# Patient Record
Sex: FEMALE | Race: WHITE | NOT HISPANIC OR LATINO | URBAN - METROPOLITAN AREA
[De-identification: names, ages, dates, MRNs, and addresses within clinical notes are randomized per-mention and may not be internally consistent; named-entity substitution may affect disease eponyms.]

---

## 2017-01-25 ENCOUNTER — COMMUNICATION - HEALTHEAST (OUTPATIENT)
Dept: FAMILY MEDICINE | Facility: CLINIC | Age: 67
End: 2017-01-25

## 2017-02-03 ENCOUNTER — OFFICE VISIT - HEALTHEAST (OUTPATIENT)
Dept: FAMILY MEDICINE | Facility: CLINIC | Age: 67
End: 2017-02-03

## 2017-02-03 DIAGNOSIS — Z01.818 PRE-OP EXAMINATION: ICD-10-CM

## 2017-02-03 LAB
ATRIAL RATE - MUSE: 70 BPM
DIASTOLIC BLOOD PRESSURE - MUSE: NORMAL MMHG
INTERPRETATION ECG - MUSE: NORMAL
P AXIS - MUSE: 31 DEGREES
PR INTERVAL - MUSE: 152 MS
QRS DURATION - MUSE: 78 MS
QT - MUSE: 372 MS
QTC - MUSE: 401 MS
R AXIS - MUSE: -37 DEGREES
SYSTOLIC BLOOD PRESSURE - MUSE: NORMAL MMHG
T AXIS - MUSE: 29 DEGREES
VENTRICULAR RATE- MUSE: 70 BPM

## 2017-02-03 RX ORDER — BIOTIN 1 MG
1000 TABLET ORAL 3 TIMES DAILY
Status: SHIPPED | COMMUNITY
Start: 2017-02-03

## 2017-02-03 ASSESSMENT — MIFFLIN-ST. JEOR: SCORE: 1132.85

## 2017-02-07 ENCOUNTER — SURGERY - HEALTHEAST (OUTPATIENT)
Dept: SURGERY | Facility: CLINIC | Age: 67
End: 2017-02-07

## 2017-02-07 ENCOUNTER — ANESTHESIA - HEALTHEAST (OUTPATIENT)
Dept: SURGERY | Facility: CLINIC | Age: 67
End: 2017-02-07

## 2017-02-07 ASSESSMENT — MIFFLIN-ST. JEOR: SCORE: 1107

## 2017-02-24 ENCOUNTER — RECORDS - HEALTHEAST (OUTPATIENT)
Dept: ADMINISTRATIVE | Facility: OTHER | Age: 67
End: 2017-02-24

## 2017-03-06 ENCOUNTER — ANESTHESIA - HEALTHEAST (OUTPATIENT)
Dept: SURGERY | Facility: CLINIC | Age: 67
End: 2017-03-06

## 2017-03-06 ASSESSMENT — MIFFLIN-ST. JEOR: SCORE: 1133

## 2017-03-07 ENCOUNTER — SURGERY - HEALTHEAST (OUTPATIENT)
Dept: SURGERY | Facility: CLINIC | Age: 67
End: 2017-03-07

## 2017-03-20 ENCOUNTER — COMMUNICATION - HEALTHEAST (OUTPATIENT)
Dept: FAMILY MEDICINE | Facility: CLINIC | Age: 67
End: 2017-03-20

## 2017-03-20 DIAGNOSIS — E78.5 HYPERLIPIDEMIA: ICD-10-CM

## 2017-03-28 ENCOUNTER — RECORDS - HEALTHEAST (OUTPATIENT)
Dept: ADMINISTRATIVE | Facility: OTHER | Age: 67
End: 2017-03-28

## 2017-04-12 ENCOUNTER — RECORDS - HEALTHEAST (OUTPATIENT)
Dept: ADMINISTRATIVE | Facility: OTHER | Age: 67
End: 2017-04-12

## 2017-06-15 ENCOUNTER — COMMUNICATION - HEALTHEAST (OUTPATIENT)
Dept: FAMILY MEDICINE | Facility: CLINIC | Age: 67
End: 2017-06-15

## 2017-09-20 ENCOUNTER — COMMUNICATION - HEALTHEAST (OUTPATIENT)
Dept: FAMILY MEDICINE | Facility: CLINIC | Age: 67
End: 2017-09-20

## 2017-09-20 DIAGNOSIS — K22.719 BARRETT'S ESOPHAGUS WITH DYSPLASIA: ICD-10-CM

## 2017-09-20 DIAGNOSIS — R41.840 ATTENTION OR CONCENTRATION DEFICIT: ICD-10-CM

## 2017-09-21 ENCOUNTER — COMMUNICATION - HEALTHEAST (OUTPATIENT)
Dept: FAMILY MEDICINE | Facility: CLINIC | Age: 67
End: 2017-09-21

## 2017-09-21 DIAGNOSIS — J45.909 ASTHMA: ICD-10-CM

## 2017-10-31 ENCOUNTER — OFFICE VISIT - HEALTHEAST (OUTPATIENT)
Dept: FAMILY MEDICINE | Facility: CLINIC | Age: 67
End: 2017-10-31

## 2017-10-31 DIAGNOSIS — Z82.62 FAMILY HISTORY OF OSTEOPOROSIS: ICD-10-CM

## 2017-10-31 DIAGNOSIS — Z00.00 HEALTH CARE MAINTENANCE: ICD-10-CM

## 2017-10-31 DIAGNOSIS — E78.5 HYPERLIPIDEMIA: ICD-10-CM

## 2017-10-31 DIAGNOSIS — K22.719 BARRETT'S ESOPHAGUS WITH DYSPLASIA: ICD-10-CM

## 2017-10-31 DIAGNOSIS — J45.909 ASTHMA: ICD-10-CM

## 2017-10-31 DIAGNOSIS — R41.840 ATTENTION OR CONCENTRATION DEFICIT: ICD-10-CM

## 2017-10-31 LAB
CHOLEST SERPL-MCNC: 234 MG/DL
FASTING STATUS PATIENT QL REPORTED: YES
HDLC SERPL-MCNC: 66 MG/DL
LDLC SERPL CALC-MCNC: 147 MG/DL
TRIGL SERPL-MCNC: 105 MG/DL

## 2017-10-31 ASSESSMENT — MIFFLIN-ST. JEOR: SCORE: 1131.43

## 2017-11-01 ENCOUNTER — COMMUNICATION - HEALTHEAST (OUTPATIENT)
Dept: FAMILY MEDICINE | Facility: CLINIC | Age: 67
End: 2017-11-01

## 2017-11-03 ENCOUNTER — RECORDS - HEALTHEAST (OUTPATIENT)
Dept: ADMINISTRATIVE | Facility: OTHER | Age: 67
End: 2017-11-03

## 2017-12-19 ENCOUNTER — COMMUNICATION - HEALTHEAST (OUTPATIENT)
Dept: FAMILY MEDICINE | Facility: CLINIC | Age: 67
End: 2017-12-19

## 2017-12-19 DIAGNOSIS — K22.719 BARRETT'S ESOPHAGUS WITH DYSPLASIA: ICD-10-CM

## 2017-12-20 ENCOUNTER — COMMUNICATION - HEALTHEAST (OUTPATIENT)
Dept: FAMILY MEDICINE | Facility: CLINIC | Age: 67
End: 2017-12-20

## 2017-12-20 DIAGNOSIS — R41.840 ATTENTION OR CONCENTRATION DEFICIT: ICD-10-CM

## 2018-01-29 ENCOUNTER — COMMUNICATION - HEALTHEAST (OUTPATIENT)
Dept: FAMILY MEDICINE | Facility: CLINIC | Age: 68
End: 2018-01-29

## 2018-01-29 DIAGNOSIS — K22.719 BARRETT'S ESOPHAGUS WITH DYSPLASIA: ICD-10-CM

## 2018-05-01 ENCOUNTER — COMMUNICATION - HEALTHEAST (OUTPATIENT)
Dept: FAMILY MEDICINE | Facility: CLINIC | Age: 68
End: 2018-05-01

## 2018-05-01 DIAGNOSIS — J45.909 ASTHMA: ICD-10-CM

## 2018-11-20 ENCOUNTER — COMMUNICATION - HEALTHEAST (OUTPATIENT)
Dept: FAMILY MEDICINE | Facility: CLINIC | Age: 68
End: 2018-11-20

## 2018-11-20 DIAGNOSIS — J45.909 ASTHMA: ICD-10-CM

## 2019-02-03 ENCOUNTER — COMMUNICATION - HEALTHEAST (OUTPATIENT)
Dept: FAMILY MEDICINE | Facility: CLINIC | Age: 69
End: 2019-02-03

## 2019-02-03 DIAGNOSIS — K22.719 BARRETT'S ESOPHAGUS WITH DYSPLASIA: ICD-10-CM

## 2019-02-08 ENCOUNTER — OFFICE VISIT - HEALTHEAST (OUTPATIENT)
Dept: FAMILY MEDICINE | Facility: CLINIC | Age: 69
End: 2019-02-08

## 2019-02-08 DIAGNOSIS — J45.20 MILD INTERMITTENT ASTHMA WITHOUT COMPLICATION: ICD-10-CM

## 2019-02-08 DIAGNOSIS — R41.840 ATTENTION OR CONCENTRATION DEFICIT: ICD-10-CM

## 2019-02-08 DIAGNOSIS — K22.719 BARRETT'S ESOPHAGUS WITH DYSPLASIA: ICD-10-CM

## 2019-02-08 DIAGNOSIS — J30.2 SEASONAL ALLERGIC RHINITIS, UNSPECIFIED TRIGGER: ICD-10-CM

## 2019-02-08 DIAGNOSIS — E78.5 HYPERLIPIDEMIA, UNSPECIFIED HYPERLIPIDEMIA TYPE: ICD-10-CM

## 2019-03-26 ENCOUNTER — OFFICE VISIT - HEALTHEAST (OUTPATIENT)
Dept: FAMILY MEDICINE | Facility: CLINIC | Age: 69
End: 2019-03-26

## 2019-03-26 DIAGNOSIS — Z00.01 ENCOUNTER FOR GENERAL ADULT MEDICAL EXAMINATION WITH ABNORMAL FINDINGS: ICD-10-CM

## 2019-03-26 DIAGNOSIS — J45.20 INTERMITTENT ASTHMA, UNSPECIFIED ASTHMA SEVERITY, UNSPECIFIED WHETHER COMPLICATED: ICD-10-CM

## 2019-03-26 DIAGNOSIS — Z12.31 VISIT FOR SCREENING MAMMOGRAM: ICD-10-CM

## 2019-03-26 DIAGNOSIS — Z78.0 POST-MENOPAUSAL: ICD-10-CM

## 2019-03-26 DIAGNOSIS — J30.9 ALLERGIC RHINITIS, UNSPECIFIED SEASONALITY, UNSPECIFIED TRIGGER: ICD-10-CM

## 2019-03-26 LAB
ALBUMIN SERPL-MCNC: 4.1 G/DL (ref 3.5–5)
ALP SERPL-CCNC: 62 U/L (ref 45–120)
ALT SERPL W P-5'-P-CCNC: 20 U/L (ref 0–45)
ANION GAP SERPL CALCULATED.3IONS-SCNC: 11 MMOL/L (ref 5–18)
AST SERPL W P-5'-P-CCNC: 21 U/L (ref 0–40)
BILIRUB SERPL-MCNC: 0.5 MG/DL (ref 0–1)
BUN SERPL-MCNC: 15 MG/DL (ref 8–22)
CALCIUM SERPL-MCNC: 10.1 MG/DL (ref 8.5–10.5)
CHLORIDE BLD-SCNC: 105 MMOL/L (ref 98–107)
CHOLEST SERPL-MCNC: 294 MG/DL
CO2 SERPL-SCNC: 26 MMOL/L (ref 22–31)
CREAT SERPL-MCNC: 0.79 MG/DL (ref 0.6–1.1)
FASTING STATUS PATIENT QL REPORTED: YES
GFR SERPL CREATININE-BSD FRML MDRD: >60 ML/MIN/1.73M2
GLUCOSE BLD-MCNC: 90 MG/DL (ref 70–125)
HDLC SERPL-MCNC: 72 MG/DL
LDLC SERPL CALC-MCNC: 206 MG/DL
POTASSIUM BLD-SCNC: 4.3 MMOL/L (ref 3.5–5)
PROT SERPL-MCNC: 7.2 G/DL (ref 6–8)
SODIUM SERPL-SCNC: 142 MMOL/L (ref 136–145)
TRIGL SERPL-MCNC: 81 MG/DL

## 2019-03-26 ASSESSMENT — MIFFLIN-ST. JEOR: SCORE: 1087.88

## 2019-03-27 ENCOUNTER — COMMUNICATION - HEALTHEAST (OUTPATIENT)
Dept: FAMILY MEDICINE | Facility: CLINIC | Age: 69
End: 2019-03-27

## 2019-03-27 ENCOUNTER — AMBULATORY - HEALTHEAST (OUTPATIENT)
Dept: FAMILY MEDICINE | Facility: CLINIC | Age: 69
End: 2019-03-27

## 2019-03-27 DIAGNOSIS — E78.5 HYPERLIPIDEMIA, UNSPECIFIED HYPERLIPIDEMIA TYPE: ICD-10-CM

## 2019-03-27 RX ORDER — ATORVASTATIN CALCIUM 10 MG/1
10 TABLET, FILM COATED ORAL DAILY
Qty: 90 TABLET | Refills: 1 | Status: SHIPPED | OUTPATIENT
Start: 2019-03-27 | End: 2021-09-20

## 2019-04-25 ENCOUNTER — OFFICE VISIT - HEALTHEAST (OUTPATIENT)
Dept: FAMILY MEDICINE | Facility: CLINIC | Age: 69
End: 2019-04-25

## 2019-04-25 DIAGNOSIS — J30.9 ALLERGIC RHINITIS, UNSPECIFIED SEASONALITY, UNSPECIFIED TRIGGER: ICD-10-CM

## 2019-04-25 DIAGNOSIS — J45.20 MILD INTERMITTENT ASTHMA WITHOUT COMPLICATION: ICD-10-CM

## 2019-04-25 DIAGNOSIS — E78.2 MIXED HYPERLIPIDEMIA: ICD-10-CM

## 2019-04-25 RX ORDER — OLOPATADINE HYDROCHLORIDE 1 MG/ML
SOLUTION/ DROPS OPHTHALMIC
Qty: 5 ML | Refills: 12 | Status: SHIPPED | OUTPATIENT
Start: 2019-04-25

## 2019-05-03 ENCOUNTER — HOSPITAL ENCOUNTER (OUTPATIENT)
Dept: MAMMOGRAPHY | Facility: CLINIC | Age: 69
Discharge: HOME | End: 2019-05-03

## 2019-05-03 ENCOUNTER — RECORDS - HEALTHEAST (OUTPATIENT)
Dept: ADMINISTRATIVE | Facility: OTHER | Age: 69
End: 2019-05-03

## 2019-05-03 ENCOUNTER — RECORDS - HEALTHEAST (OUTPATIENT)
Dept: BONE DENSITY | Facility: CLINIC | Age: 69
End: 2019-05-03

## 2019-05-03 DIAGNOSIS — Z78.0 ASYMPTOMATIC MENOPAUSAL STATE: ICD-10-CM

## 2019-05-03 DIAGNOSIS — Z12.31 VISIT FOR SCREENING MAMMOGRAM: ICD-10-CM

## 2019-05-08 ENCOUNTER — COMMUNICATION - HEALTHEAST (OUTPATIENT)
Dept: FAMILY MEDICINE | Facility: CLINIC | Age: 69
End: 2019-05-08

## 2019-05-08 DIAGNOSIS — J30.2 SEASONAL ALLERGIC RHINITIS, UNSPECIFIED TRIGGER: ICD-10-CM

## 2019-05-08 RX ORDER — DESLORATADINE 5 MG/1
TABLET ORAL
Qty: 90 TABLET | Refills: 3 | Status: SHIPPED | OUTPATIENT
Start: 2019-05-08

## 2019-05-24 ENCOUNTER — COMMUNICATION - HEALTHEAST (OUTPATIENT)
Dept: FAMILY MEDICINE | Facility: CLINIC | Age: 69
End: 2019-05-24

## 2019-05-24 DIAGNOSIS — R41.840 ATTENTION OR CONCENTRATION DEFICIT: ICD-10-CM

## 2019-08-12 ENCOUNTER — COMMUNICATION - HEALTHEAST (OUTPATIENT)
Dept: FAMILY MEDICINE | Facility: CLINIC | Age: 69
End: 2019-08-12

## 2019-08-12 DIAGNOSIS — K22.719 BARRETT'S ESOPHAGUS WITH DYSPLASIA: ICD-10-CM

## 2019-08-16 ENCOUNTER — COMMUNICATION - HEALTHEAST (OUTPATIENT)
Dept: FAMILY MEDICINE | Facility: CLINIC | Age: 69
End: 2019-08-16

## 2019-08-16 DIAGNOSIS — E78.5 HYPERLIPIDEMIA, UNSPECIFIED HYPERLIPIDEMIA TYPE: ICD-10-CM

## 2019-11-14 ENCOUNTER — RECORDS - HEALTHEAST (OUTPATIENT)
Dept: ADMINISTRATIVE | Facility: OTHER | Age: 69
End: 2019-11-14

## 2019-11-18 ENCOUNTER — COMMUNICATION - HEALTHEAST (OUTPATIENT)
Dept: FAMILY MEDICINE | Facility: CLINIC | Age: 69
End: 2019-11-18

## 2019-11-18 DIAGNOSIS — J45.20 MILD INTERMITTENT ASTHMA WITHOUT COMPLICATION: ICD-10-CM

## 2019-12-02 ENCOUNTER — COMMUNICATION - HEALTHEAST (OUTPATIENT)
Dept: FAMILY MEDICINE | Facility: CLINIC | Age: 69
End: 2019-12-02

## 2020-05-12 ENCOUNTER — COMMUNICATION - HEALTHEAST (OUTPATIENT)
Dept: FAMILY MEDICINE | Facility: CLINIC | Age: 70
End: 2020-05-12

## 2020-05-12 DIAGNOSIS — K22.719 BARRETT'S ESOPHAGUS WITH DYSPLASIA: ICD-10-CM

## 2020-05-12 DIAGNOSIS — R41.840 ATTENTION OR CONCENTRATION DEFICIT: ICD-10-CM

## 2020-05-18 ENCOUNTER — COMMUNICATION - HEALTHEAST (OUTPATIENT)
Dept: FAMILY MEDICINE | Facility: CLINIC | Age: 70
End: 2020-05-18

## 2020-05-18 DIAGNOSIS — J45.20 MILD INTERMITTENT ASTHMA WITHOUT COMPLICATION: ICD-10-CM

## 2020-06-08 ENCOUNTER — COMMUNICATION - HEALTHEAST (OUTPATIENT)
Dept: FAMILY MEDICINE | Facility: CLINIC | Age: 70
End: 2020-06-08

## 2020-06-08 DIAGNOSIS — J45.20 MILD INTERMITTENT ASTHMA WITHOUT COMPLICATION: ICD-10-CM

## 2020-06-08 DIAGNOSIS — R41.840 ATTENTION OR CONCENTRATION DEFICIT: ICD-10-CM

## 2020-06-08 DIAGNOSIS — K22.719 BARRETT'S ESOPHAGUS WITH DYSPLASIA: ICD-10-CM

## 2020-06-08 RX ORDER — MONTELUKAST SODIUM 10 MG/1
10 TABLET ORAL DAILY
Qty: 90 TABLET | Refills: 3 | Status: SHIPPED | OUTPATIENT
Start: 2020-06-08

## 2020-06-08 RX ORDER — LANSOPRAZOLE 30 MG/1
30 CAPSULE, DELAYED RELEASE ORAL DAILY
Qty: 90 CAPSULE | Refills: 3 | Status: SHIPPED | OUTPATIENT
Start: 2020-06-08

## 2020-06-08 RX ORDER — BUPROPION HYDROCHLORIDE 300 MG/1
300 TABLET ORAL EVERY MORNING
Qty: 90 TABLET | Refills: 3 | Status: SHIPPED | OUTPATIENT
Start: 2020-06-08 | End: 2021-09-20

## 2020-06-12 ENCOUNTER — OFFICE VISIT - HEALTHEAST (OUTPATIENT)
Dept: FAMILY MEDICINE | Facility: CLINIC | Age: 70
End: 2020-06-12

## 2020-06-12 DIAGNOSIS — F32.89 OTHER DEPRESSION: ICD-10-CM

## 2020-06-12 DIAGNOSIS — H04.123 DRY EYES: ICD-10-CM

## 2020-06-12 DIAGNOSIS — E78.00 PURE HYPERCHOLESTEROLEMIA: ICD-10-CM

## 2020-06-12 DIAGNOSIS — J45.20 MILD INTERMITTENT ASTHMA WITHOUT COMPLICATION: ICD-10-CM

## 2020-06-12 DIAGNOSIS — D22.9 CHANGE IN SKIN MOLE: ICD-10-CM

## 2020-06-12 RX ORDER — ROSUVASTATIN CALCIUM 5 MG/1
5 TABLET, COATED ORAL AT BEDTIME
Qty: 30 TABLET | Refills: 11 | Status: SHIPPED | OUTPATIENT
Start: 2020-06-12

## 2020-06-12 RX ORDER — ALBUTEROL SULFATE 90 UG/1
2 AEROSOL, METERED RESPIRATORY (INHALATION) EVERY 6 HOURS PRN
Qty: 1 INHALER | Refills: 3 | Status: SHIPPED | OUTPATIENT
Start: 2020-06-12

## 2020-07-20 ENCOUNTER — RECORDS - HEALTHEAST (OUTPATIENT)
Dept: ADMINISTRATIVE | Facility: OTHER | Age: 70
End: 2020-07-20

## 2020-08-10 ENCOUNTER — RECORDS - HEALTHEAST (OUTPATIENT)
Dept: ADMINISTRATIVE | Facility: OTHER | Age: 70
End: 2020-08-10

## 2020-11-24 ENCOUNTER — RECORDS - HEALTHEAST (OUTPATIENT)
Dept: ADMINISTRATIVE | Facility: OTHER | Age: 70
End: 2020-11-24

## 2021-04-15 ENCOUNTER — COMMUNICATION - HEALTHEAST (OUTPATIENT)
Dept: FAMILY MEDICINE | Facility: CLINIC | Age: 71
End: 2021-04-15

## 2021-04-15 DIAGNOSIS — K22.719 BARRETT'S ESOPHAGUS WITH DYSPLASIA: ICD-10-CM

## 2021-04-19 ENCOUNTER — COMMUNICATION - HEALTHEAST (OUTPATIENT)
Dept: FAMILY MEDICINE | Facility: CLINIC | Age: 71
End: 2021-04-19

## 2021-04-19 DIAGNOSIS — R41.840 ATTENTION OR CONCENTRATION DEFICIT: ICD-10-CM

## 2021-04-19 DIAGNOSIS — J45.20 MILD INTERMITTENT ASTHMA WITHOUT COMPLICATION: ICD-10-CM

## 2021-04-19 DIAGNOSIS — K22.719 BARRETT'S ESOPHAGUS WITH DYSPLASIA: ICD-10-CM

## 2021-05-27 NOTE — TELEPHONE ENCOUNTER
----- Message from Nehemiah Akers MD sent at 3/27/2019  8:50 AM CDT -----  Please inform patient that her cholesterol profile shows extremely high cholesterol levels including extremely high LDL that is the bad cholesterol level.  She should be on a statin therapy.  Review of chart shows that she was taking Lipitor in the past.  Please ask patient if she has discontinued Lipitor?  Or if she ran out of the medication.  I am a sending a refill and she should resume the medication and we should recheck these numbers in about 3 months time.  Nehemiah Akers MD  3/27/2019

## 2021-05-27 NOTE — PROGRESS NOTES
Assessment and Plan:     1. Visit for screening mammogram  Mammo Screening Bilateral   2. Encounter for general adult medical examination with abnormal findings  Lipid Cameron FASTING    Comprehensive Metabolic Panel   3. Post-menopausal  DXA Bone Density Scan   4. Allergic rhinitis, unspecified seasonality, unspecified trigger  fluticasone (FLONASE) 50 mcg/actuation nasal spray    olopatadine (PATANOL) 0.1 % ophthalmic solution   5. Intermittent asthma, unspecified asthma severity, unspecified whether complicated         The patient's current medical problems were reviewed.    I have had an Advance Directives discussion with the patient.  The following health maintenance schedule was reviewed with the patient and provided in printed form in the after visit summary:   Health Maintenance   Topic Date Due     ASTHMA FOLLOW-UP  1950     ZOSTER VACCINES (2 of 3) 11/21/2016     DXA SCAN  04/25/2017     MAMMOGRAM  10/11/2018     COLONOSCOPY  04/22/2019 (Originally 4/21/2019)     ASTHMA CONTROL TEST  03/26/2020     FALL RISK ASSESSMENT  03/26/2020     ADVANCE DIRECTIVES DISCUSSED WITH PATIENT  09/26/2021     TD 18+ HE  09/26/2026     PNEUMOCOCCAL POLYSACCHARIDE VACCINE AGE 65 AND OVER  Completed     INFLUENZA VACCINE RULE BASED  Completed     PNEUMOCOCCAL CONJUGATE VACCINE FOR ADULTS (PCV13 OR PREVNAR)  Completed        Subjective:     Chief Complaint   Patient presents with     Annual Wellness Visit     fasting labs     Chief Complaint: Judith Moreno is an 68 y.o. female here for an Annual Wellness visit.     HPI: She has a lot of symptoms of allergies.  She also has intermittent asthma and is only on albuterol.  Although on discussing with her it appears most of her symptoms are related to nasal congestion and postnasal drip and she complains about watery eyes whenever she goes out.  She denies any fever.  She denies any productive cough.  She relates she is allergic to her cats.    She informs me that her  appetite is good.  No chest pain.  No nausea vomiting diarrhea.    Review of Systems: Please see above.  The rest of the review of systems are negative for all systems.    Patient Care Team:  Nehemiah Akers MD as PCP - General (Family Medicine)     Patient Active Problem List   Diagnosis     Asthma     Decreased Concentrating Ability     Short Segment Mosley's Esophagus     Hyperlipidemia     Past Medical History:   Diagnosis Date     ADD (attention deficit disorder)      Asthma      Mosley's esophagus      GERD (gastroesophageal reflux disease)      Hip pain     Right Side     Low back pain     Right Side     Right foot pain       Past Surgical History:   Procedure Laterality Date     ANKLE ARTHROSCOPY Right 3/7/2017    Procedure: STAGE II:  RIGHT ANKLE ARTHROSCOPY ;  Surgeon: Binh Arrington MD;  Location: [facility];  Service:      ANKLE LIGAMENT RECONSTRUCTION Right 3/7/2017    Procedure: LATERAL ANKLE LIGAMENT RECONSTRUCTION WITH INTERNAL BRACE;  Surgeon: Binh Arrington MD;  Location: [facility];  Service:      GANGLION CYST EXCISION Bilateral     right hand x1, left x2     OR APPENDECTOMY      Description: Appendectomy;  Recorded: 2014;  Comments: ? with      OR  DELIVERY ONLY      Description:  Section Classical;  Recorded: 2014;  Comments: x 2 and tubal ligation     OR FUSION FOOT BONES,TRIPLE Right 2017    Procedure: STAGE 1: RIGHT NAVICULOCUNEIFORM ARTHRODESIS WITH CALCANEAL BONE GRAFT;  Surgeon: Binh Arrington MD;  Location: [facility];  Service: Orthopedics     OR HALLUX RIGIDUS W/CHEILECTOMY 1ST MP JT W/O IMPLT Right 3/7/2017    Procedure: CHEILECTOMY WITH POSSIBLE SOFT TISSUE ARTHROPLASTY;  Surgeon: Binh Arrington MD;  Location: [facility];  Service: Orthopedics      Family History   Problem Relation Age of Onset     Breast cancer Mother 80         lived to 92     Cancer Father         unknown       Social History     Socioeconomic History     Marital status: Single     Spouse name: Not on file     Number of children: Not on file     Years of education: Not on file     Highest education level: Not on file   Occupational History     Not on file   Social Needs     Financial resource strain: Not on file     Food insecurity:     Worry: Not on file     Inability: Not on file     Transportation needs:     Medical: Not on file     Non-medical: Not on file   Tobacco Use     Smoking status: Former Smoker     Packs/day: 0.50     Years: 20.00     Pack years: 10.00     Last attempt to quit:      Years since quittin.2     Smokeless tobacco: Never Used   Substance and Sexual Activity     Alcohol use: Yes     Comment: rarely     Drug use: No     Sexual activity: Not on file   Lifestyle     Physical activity:     Days per week: Not on file     Minutes per session: Not on file     Stress: Not on file   Relationships     Social connections:     Talks on phone: Not on file     Gets together: Not on file     Attends [employer] service: Not on file     Active member of club or organization: Not on file     Attends meetings of clubs or organizations: Not on file     Relationship status: Not on file     Intimate partner violence:     Fear of current or ex partner: Not on file     Emotionally abused: Not on file     Physically abused: Not on file     Forced sexual activity: Not on file   Other Topics Concern     Not on file   Social History Narrative    Lives with Male partner. 2 cats and 2 dogs. Has 2 adult children and one adopted and  grand children.    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        Nehemiah Akers MD  3/26/2019            The 10-year ASCVD risk score (Renzo METZ Jr., et al., 2013) is: 6%      Values used to calculate the score:        Age: 68 years        Sex: Female        Is Non- : No        Diabetic: No        Tobacco smoker: No        Systolic Blood Pressure:  "120 mmHg        Is BP treated: No        HDL Cholesterol: 72 mg/dL        Total Cholesterol: 294 mg/dL      Current Outpatient Medications   Medication Sig Dispense Refill     albuterol (VENTOLIN HFA) 90 mcg/actuation inhaler Inhale 2 puffs every 6 (six) hours as needed. 1 Inhaler 3     biotin 1 mg tablet Take 1,000 mcg by mouth 3 (three) times a day.       CALCIUM CARBONATE (CALCIUM 500 ORAL) Take 500 mg by mouth daily.        CHOLECALCIFEROL, VITAMIN D3, (D3-2000 ORAL) Take 1 capsule by mouth daily.        desloratadine (CLARINEX) 5 mg tablet Take 1 tablet (5 mg total) by mouth daily. 30 tablet 2     lansoprazole (PREVACID) 30 MG capsule Take 1 capsule (30 mg total) by mouth daily. 90 capsule 1     LUTEIN ORAL Take 1 tablet by mouth daily.        montelukast (SINGULAIR) 10 mg tablet Take 1 tablet (10 mg total) by mouth daily. Needs office visit before more refills 90 tablet 1     atorvastatin (LIPITOR) 10 MG tablet Take 1 tablet (10 mg total) by mouth daily. 90 tablet 1     fluticasone (FLONASE) 50 mcg/actuation nasal spray 2 sprays into each nostril daily. 16 g 12     olopatadine (PATANOL) 0.1 % ophthalmic solution 1 drop two times a day 5 mL 12     No current facility-administered medications for this visit.       Objective:   Vital Signs:   Visit Vitals  /79 (Patient Site: Left Arm, Patient Position: Sitting, Cuff Size: Adult Regular)   Pulse 76   Temp 96.8  F (36  C) (Oral)   Resp 16   Ht 5' 4\" (1.626 m)   Wt 127 lb 6.4 oz (57.8 kg)   LMP 10/11/2006   BMI 21.87 kg/m         VisionScreening:  No exam data present     PHYSICAL EXAM    General:Healthy, alert and in no acute distress  Head:  NCAT w/o lesions or tenderness  Eyes: conjunctivae/corneas clear. PERRL, EOM's intact. Fundi benign  Ears: normal TM's and external ear canals bilateral  Sinus tender: negative  Nose: Enlarged and erythematous turbinates  Mouth: lips, mucosa, and tongue normal. Teeth and gums normal. No tonsillar endangerment, Mild " erythema of pharynx  Neck: supple, symmetrical, trachea midline.  Lungs: clear to auscultation bilaterally  Heart: RRR, No murmurs  Abdomen: Soft NTND, No HSM, No peritoneal signs, Rebound negative, Mc Milan's sign negative, No CVA tenderness.  Skin: No rashes        Assessment Results 3/26/2019   Activities of Daily Living No help needed   Instrumental Activities of Daily Living No help needed   Mini Cog Total Score 3   Some recent data might be hidden     A Mini-Cog score of 0-2 suggests the possibility of dementia, score of 3-5 suggests no dementia    Identified Health Risks:     The patient was provided with written information regarding signs of hearing loss.  Information regarding advance directives (living garcia), including where she can download the appropriate form, was provided to the patient via the AVS.

## 2021-05-28 ASSESSMENT — ASTHMA QUESTIONNAIRES: ACT_TOTALSCORE: 21

## 2021-05-28 NOTE — TELEPHONE ENCOUNTER
Refill Approved    Rx renewed per Medication Renewal Policy. Medication was last renewed on 2/8/19.    Roxann Hurley, [facility] Triage/Med Refill 5/8/2019     Requested Prescriptions   Pending Prescriptions Disp Refills     desloratadine (CLARINEX) 5 mg tablet [Pharmacy Med Name: Desloratadine Oral Tablet 5 MG] 30 tablet 1     Sig: TAKE ONE TABLET BY MOUTH ONE TIME DAILY       Antihistamine Refill Protocol Passed - 5/8/2019  7:44 AM        Passed - Patient has had office visit/physical in last year     Last office visit with prescriber/PCP: 2/8/2019 Fiordaliza Griffin FNP OR same dept: 4/25/2019 Nehemiah Akers MD OR same specialty: 4/25/2019 Nehemiah Akers MD  Last physical: 10/31/2017 Last MTM visit: Visit date not found   Next visit within 3 mo: Visit date not found  Next physical within 3 mo: Visit date not found  Prescriber OR PCP: MARY Daugherty  Last diagnosis associated with med order: 1. Seasonal allergic rhinitis, unspecified trigger  - desloratadine (CLARINEX) 5 mg tablet [Pharmacy Med Name: Desloratadine Oral Tablet 5 MG]; TAKE ONE TABLET BY MOUTH ONE TIME DAILY   Dispense: 30 tablet; Refill: 1    If protocol passes may refill for 12 months if within 3 months of last provider visit (or a total of 15 months).

## 2021-05-28 NOTE — PATIENT INSTRUCTIONS - HE
Follow up in the fall for allergies and possible asthma      Come back in 6- 8 weeeks for recheck Lipids ( lab only visit).    Goal for LDL is < 130- 160.    Nehemiah Akers MD  4/25/2019

## 2021-05-28 NOTE — PROGRESS NOTES
Assessment:     1. Mixed hyperlipidemia  Lipid [place]   2. Mild intermittent asthma without complication  montelukast (SINGULAIR) 10 mg tablet   3. Allergic rhinitis, unspecified seasonality, unspecified trigger  fluticasone propionate (FLONASE) 50 mcg/actuation nasal spray    olopatadine (PATANOL) 0.1 % ophthalmic solution      .      Plan:      The diagnosis was discussed with the patient and evaluation and treatment plans outlined.  See orders for lab and imaging studies.   Patient Instructions   Follow up in the fall for allergies and possible asthma      Come back in 6- 8 weeeks for recheck Lipids ( lab only visit).    Goal for LDL is < 130- 160.    Nehemiah Akers MD  4/25/2019             Points   Total Points: 16 = 4% risk of heart disease in 10 years.  Average 10-year risk: = 9% (for others in your age group).  LDL Cholesterol Goal: = <160 mg/dl    This calculation is recommended by the National Cholesterol Education Program Adult Treatment Panel III to help determine your goal LDL cholesterol level. Your LDL cholesterol goal is < 100 if you have established coronary heart disease, peripheral arterial disease, diabetes, or a calculated 10-year risk for CHD of > 20%.  The results you receive from this tool are for informational purposes only and should not be the basis of your medical decision making. Consult your physician to determine the medical implications of any tests you take.      Subjective:      Judith Moreno is a  female who presents for evaluation of   Chief Complaint   Patient presents with     Follow-up     Pt heretoday for follow up of medications that were added from las  on 3/26/19- Pt states Rx has helped the allergies      1- Seasonal Allergies: These are remarkably better after starting nasal steroid and allergy medication.  She informed me that she is actually able to sit out side without her eyes watering.  Her left knee and had been started at the last visit.  Please see my  last note for details.    2- Hyperlipidemia: Total cholesterol was greater than 300 with LDL greater than 2016.  Subsequently she was started on Lipitor.  At the last visit it was noted that her LDL is greater than 200 again.  Patient does admit that she had not been taking the Lipitor which was then reinitiated.  We discussed with her that we should check a lipid panel in a couple of months.  She can come for lab only visit.    3- Mild intermittent Asthma: Currently she has no wheezing.  She does have albuterol to use as needed.  As discussed with her at last visit, most of her symptoms appear to be seasonal allergies.  I did discuss that she should follow-up when her symptoms start getting up as she is currently not on any inhaled steroid to evaluate for the need.  We discussed that she does not need to be on steroid inhaler indefinitely.  We can do the stepup and stepdown therapy.  Patient is agreeable to admit her come back in follow-up in her asthma exacerbate.  Meanwhile she will use albuterol as needed.      The following portions of the patient's history were reviewed and updated as appropriate: allergies, current medications, past family history, past medical history, past social history, past surgical history and problem list.    Review of Systems  A 12 point comprehensive review of systems was negative except as noted.       Objective:   /72 (Patient Site: Left Arm, Patient Position: Sitting, Cuff Size: Adult Regular)   Pulse 72   Resp 16   Wt 124 lb 6.4 oz (56.4 kg)   LMP 10/11/2006   SpO2 98%   BMI 21.35 kg/m      General:Healthy, alert and in no acute distress  Head:  NCAT w/o lesions or tenderness  Eyes: conjunctivae/corneas clear. PERRL, EOM's intact. Fundi benign  Ears: normal TM's and external ear canals bilateral  Sinus tender: negative  Nose: Erythematous turbinates that are less swollen than before.  Mouth: lips, mucosa, and tongue normal. Teeth and gums normal. No tonsillar  endangerment or  erythema of pharynx  Neck: supple, symmetrical, trachea midline.  Lungs: clear to auscultation bilaterally  Heart: RRR, No murmurs  Skin: No rashes

## 2021-05-29 ENCOUNTER — HEALTH MAINTENANCE LETTER (OUTPATIENT)
Age: 71
End: 2021-05-29

## 2021-05-29 NOTE — TELEPHONE ENCOUNTER
Medication Request  Medication name: bupropion 300 mg - take 1 tab by mouth daily  Pharmacy Name and Location: [facility]  Reason for request: Patient stated she would like to restart this again.  When did you use medication last?:  Feb 2019  Patient offered appointment:  Patient stated she talked to Dr. Akers about restarting the bupropion Rx. Patient stated she was told by Dr. Akers that she would get an Rx for it.  Okay to leave a detailed message: yes  [phone]

## 2021-05-29 NOTE — TELEPHONE ENCOUNTER
Please inform patient that I am starting her on bupropion 150 mg daily  and in 1 week she can increase it to 300 mg daily     Nehemiah Akers MD  5/28/2019

## 2021-05-29 NOTE — TELEPHONE ENCOUNTER
Left message to call back for: Bupropion Refill  Information to relay to patient: Left detailed message for patient with Dr. Akers's instructions below.  Pt to call back with any further questions or concerns.

## 2021-05-30 VITALS — WEIGHT: 137.35 LBS | BODY MASS INDEX: 23.45 KG/M2 | HEIGHT: 64 IN

## 2021-05-30 VITALS — HEIGHT: 64 IN | WEIGHT: 137.31 LBS | BODY MASS INDEX: 23.44 KG/M2

## 2021-05-30 VITALS — HEIGHT: 64 IN | WEIGHT: 131.61 LBS | BODY MASS INDEX: 22.47 KG/M2

## 2021-05-31 VITALS — HEIGHT: 64 IN | BODY MASS INDEX: 23.39 KG/M2 | WEIGHT: 137 LBS

## 2021-05-31 NOTE — TELEPHONE ENCOUNTER
Patient Returning Call  Reason for call:  Patient returning call on refills she requested.  Information relayed to patient:  Patient was informed the Prevacid was sent to the pharmacy on 8/13/19, the other medications were to be requested by the pharmacy except for the Lipitor and that required labs prior to being filled. Patient was transferred to [facility] for a lab only appointment.    Patient has additional questions:  Yes  If YES, what are your questions/concerns:  Patient agreed to return call to clinic if there were any other concerns with the medication refills she needed.  Okay to leave a detailed message?: Yes

## 2021-05-31 NOTE — TELEPHONE ENCOUNTER
Refill Approved    Rx renewed per Medication Renewal Policy. Medication was last renewed on 2/8/19.    Roxann Hurley, [facility] Triage/Med Refill 8/13/2019     Requested Prescriptions   Pending Prescriptions Disp Refills     lansoprazole (PREVACID) 30 MG capsule [Pharmacy Med Name: Lansoprazole Oral Capsule Delayed Release 30 MG] 90 capsule 0     Sig: TAKE ONE CAPSULE BY MOUTH ONE TIME DAILY       GI Medications Refill Protocol Passed - 8/12/2019  6:56 PM        Passed - PCP or prescribing provider visit in last 12 or next 3 months.     Last office visit with prescriber/PCP: 2/8/2019 Fiordaliza Griffin FNP OR same dept: 4/25/2019 Nehemiah Akers MD OR same specialty: 4/25/2019 Nehemiah Akers MD  Last physical: 10/31/2017 Last MTM visit: Visit date not found   Next visit within 3 mo: Visit date not found  Next physical within 3 mo: Visit date not found  Prescriber OR PCP: MARY Daugherty  Last diagnosis associated with med order: 1. Mosley's esophagus with dysplasia  - lansoprazole (PREVACID) 30 MG capsule [Pharmacy Med Name: Lansoprazole Oral Capsule Delayed Release 30 MG]; TAKE ONE CAPSULE BY MOUTH ONE TIME DAILY   Dispense: 90 capsule; Refill: 0    If protocol passes may refill for 12 months if within 3 months of last provider visit (or a total of 15 months).

## 2021-06-02 ENCOUNTER — RECORDS - HEALTHEAST (OUTPATIENT)
Dept: ADMINISTRATIVE | Facility: CLINIC | Age: 71
End: 2021-06-02

## 2021-06-02 VITALS — BODY MASS INDEX: 21.75 KG/M2 | HEIGHT: 64 IN | WEIGHT: 127.4 LBS

## 2021-06-02 VITALS — WEIGHT: 127.8 LBS | BODY MASS INDEX: 21.94 KG/M2

## 2021-06-03 VITALS — WEIGHT: 124.4 LBS | BODY MASS INDEX: 21.35 KG/M2

## 2021-06-03 NOTE — TELEPHONE ENCOUNTER
Refill Approved    Rx renewed per Medication Renewal Policy. Medication was last renewed on 4/25/2019         Adam Garcia, [facility] Triage/Med Refill 11/19/2019     Requested Prescriptions   Pending Prescriptions Disp Refills     montelukast (SINGULAIR) 10 mg tablet 90 tablet 1     Sig: Take 1 tablet (10 mg total) by mouth daily. Needs office visit before more refills       Asthma Medications Refill Protocol Passed - 11/18/2019 12:11 PM        Passed - PCP or prescribing provider visit in last year     Last office visit with prescriber/PCP: 4/25/2019 Nehemiah Akers MD OR same dept: 4/25/2019 Nehemiah Akers MD OR same specialty: 4/25/2019 Nehemiah Akers MD  Last physical: 3/26/2019 Last MTM visit: Visit date not found    Next appt within 3 mo: Visit date not found Next physical within 3 mo: Visit date not found  Prescriber OR PCP: Nehemiah Akers MD  Last diagnosis associated with med order: 1. Mild intermittent asthma without complication  - montelukast (SINGULAIR) 10 mg tablet; Take 1 tablet (10 mg total) by mouth daily. Needs office visit before more refills  Dispense: 90 tablet; Refill: 1    If protocol passes may refill for 6 months if within 3 months of last provider visit (or a total of 9 months).

## 2021-06-03 NOTE — TELEPHONE ENCOUNTER
New Appointment Needed  What is the reason for the visit:  Same Day  Same Date/Next Day Appt Request  What is the reason for your visit?: Going on 3 weeks with bronchitis, had went to urgency room and was put on a Steroid to reduce inflammation, but patient just does not think she can shake this, wants to make sure there is not something secondary. Patient is constantly coughing and feels like she is suffocating, has gotten worse.  --Declined nurse triage at this time, patient is going to try to go to work.  Provider Preference: Dr. Toni Kemp at [facility].  Patient informed that she used to see him when he practiced at another clinic and would really like to see him for this.  How soon do you need to be seen?: today  Waitlist offered?: No  Okay to leave a detailed message:  Yes

## 2021-06-04 VITALS
OXYGEN SATURATION: 97 % | HEART RATE: 83 BPM | SYSTOLIC BLOOD PRESSURE: 116 MMHG | DIASTOLIC BLOOD PRESSURE: 78 MMHG | BODY MASS INDEX: 21.47 KG/M2 | WEIGHT: 125.1 LBS

## 2021-06-08 NOTE — ANESTHESIA POSTPROCEDURE EVALUATION
Patient: Judith Moreno  STAGE 1: RIGHT NAVICULOCUNEIFORM ARTHRODESIS WITH CALCANEAL BONE GRAFT  Anesthesia type: general    Patient location: PACU  Last vitals:   Vitals:    02/07/17 1430   BP: 120/69   Pulse: 79   Resp: 18   Temp:    SpO2: 94%     Post vital signs: stable  Level of consciousness: awake and responds to simple questions  Post-anesthesia pain: pain controlled  Post-anesthesia nausea and vomiting: no  Pulmonary: unassisted, return to baseline  Cardiovascular: stable and blood pressure at baseline  Hydration: adequate  Anesthetic events: no    QCDR Measures:  ASA# 11 - Rebeka-op Cardiac Arrest: ASA11B - Patient did NOT experience unanticipated cardiac arrest  ASA# 12 - Rebeka-op Mortality Rate: ASA12B - Patient did NOT die  ASA# 13 - PACU Re-Intubation Rate: ASA13B - Patient did NOT require a new airway mgmt  ASA# 10 - Composite Anes Safety: ASA10A - No serious adverse event  ASA# 38 - New Corneal Injury: ASA38A - No new exposure keratitis or corneal abrasion in PACU    Additional Notes:

## 2021-06-08 NOTE — TELEPHONE ENCOUNTER
RN cannot approve Refill Request    RN can NOT refill this medication Protocol failed and NO refill given.       Roxann Hurley, [facility] Triage/Med Refill 5/13/2020    Requested Prescriptions   Pending Prescriptions Disp Refills     lansoprazole (PREVACID) 30 MG capsule 90 capsule 3     Sig: Take 1 capsule (30 mg total) by mouth daily.       GI Medications Refill Protocol Failed - 5/12/2020 11:45 AM        Failed - PCP or prescribing provider visit in last 12 or next 3 months.     Last office visit with prescriber/PCP: 4/25/2019 Nehemiah Akers MD OR same dept: Visit date not found OR same specialty: 4/25/2019 Nehemiah Akers MD  Last physical: 3/26/2019 Last MTM visit: Visit date not found   Next visit within 3 mo: Visit date not found  Next physical within 3 mo: Visit date not found  Prescriber OR PCP: Nehemiah Akers MD  Last diagnosis associated with med order: 1. Mosley's esophagus with dysplasia  - lansoprazole (PREVACID) 30 MG capsule; Take 1 capsule (30 mg total) by mouth daily.  Dispense: 90 capsule; Refill: 2    2. Decreased Concentrating Ability  - buPROPion (WELLBUTRIN XL) 300 MG 24 hr tablet; Take 1 tablet (300 mg total) by mouth every morning.  Dispense: 90 tablet; Refill: 3    If protocol passes may refill for 12 months if within 3 months of last provider visit (or a total of 15 months).                buPROPion (WELLBUTRIN XL) 300 MG 24 hr tablet 90 tablet 3     Sig: Take 1 tablet (300 mg total) by mouth every morning.       Tricyclics/Misc Antidepressant/Antianxiety Meds Refill Protocol Failed - 5/12/2020 11:45 AM        Failed - PCP or prescribing provider visit in last year     Last office visit with prescriber/PCP: 4/25/2019 Nehemiah Akers MD OR same dept: Visit date not found OR same specialty: 4/25/2019 Nehemiah Akers MD  Last physical: 3/26/2019 Last MTM visit: Visit date not found   Next visit within 3 mo: Visit date not found  Next physical within 3 mo: Visit date not  found  Prescriber OR PCP: Nehemiah Akers MD  Last diagnosis associated with med order: 1. Mosley's esophagus with dysplasia  - lansoprazole (PREVACID) 30 MG capsule; Take 1 capsule (30 mg total) by mouth daily.  Dispense: 90 capsule; Refill: 2    2. Decreased Concentrating Ability  - buPROPion (WELLBUTRIN XL) 300 MG 24 hr tablet; Take 1 tablet (300 mg total) by mouth every morning.  Dispense: 90 tablet; Refill: 3    If protocol passes may refill for 12 months if within 3 months of last provider visit (or a total of 15 months).

## 2021-06-08 NOTE — TELEPHONE ENCOUNTER
RN cannot approve Refill Request    RN can NOT refill this medication Protocol failed and NO refill given.     Roxann Hurley, [facility] Triage/Med Refill 5/20/2020    Requested Prescriptions   Pending Prescriptions Disp Refills     montelukast (SINGULAIR) 10 mg tablet 90 tablet 3     Sig: Take 1 tablet (10 mg total) by mouth daily. Needs office visit before more refills       Asthma Medications Refill Protocol Failed - 5/18/2020 11:45 AM        Failed - PCP or prescribing provider visit in last year     Last office visit with prescriber/PCP: 4/25/2019 Nehemiah Akers MD OR same dept: Visit date not found OR same specialty: 4/25/2019 Nehemiah Akers MD  Last physical: 3/26/2019 Last MTM visit: Visit date not found    Next appt within 3 mo: Visit date not found Next physical within 3 mo: Visit date not found  Prescriber OR PCP: Nehemiah Akers MD  Last diagnosis associated with med order: 1. Mild intermittent asthma without complication  - montelukast (SINGULAIR) 10 mg tablet; Take 1 tablet (10 mg total) by mouth daily. Needs office visit before more refills  Dispense: 90 tablet; Refill: 1    If protocol passes may refill for 6 months if within 3 months of last provider visit (or a total of 9 months).

## 2021-06-08 NOTE — ANESTHESIA CARE TRANSFER NOTE
Patient spontaneously breathing.  Tidal volumes > 500ml.  Following commands, suctioned and extubated with balloon down.  O2 via mask at 10L.  To PACU, VSS, SBAR report to RN per institutional handoff policy and procedure.  Transfer of care.    Last vitals:   Vitals:    02/07/17 1158   BP: 120/77   Pulse: 90   Resp: 16   Temp: 36.5  C (97.7  F)   SpO2: 100%     Patient's level of consciousness is drowsy  Spontaneous respirations: yes  Maintains airway independently: yes  Dentition unchanged: yes  Oropharynx: oropharynx clear of all foreign objects    QCDR Measures:  ASA# 20 - Surgical Safety Checklist: ASA20A - Safety Checks Done  PQRS# 430 - Adult PONV Prevention: 4558F - Pt received => 2 anti-emetic agents (different classes) preop & intraop  ASA# 8 - Peds PONV Prevention: NA - Not pediatric patient, not GA or 2 or more risk factors NOT present  PQRS# 424 - Rebeka-op Temp Management: 4559F - At least one body temp DOCUMENTED => 35.5C or 95.9F within required timeframe  PQRS# 426 - PACU Transfer Protocol: - Transfer of care checklist used  ASA# 14 - Acute Post-op Pain: ASA14B - Patient did NOT experience pain >= 7 out of 10    I completed my SBAR handoff to the receiving nurse per policy and procedure.

## 2021-06-08 NOTE — ANESTHESIA PROCEDURE NOTES
Peripheral Block    Patient location during procedure: pre-op  Start time: 2/7/2017 8:55 AM  End time: 2/7/2017 9:05 AM  post-op analgesia per surgeon order as noted in medical record  Staffing:  Performing  Anesthesiologist: ERNESTO FAITH  Preanesthetic Checklist  Completed: patient identified, site marked, risks, benefits, and alternatives discussed, timeout performed, consent obtained, airway assessed, oxygen available, suction available, emergency drugs available and hand hygiene performed  Peripheral Block  Block type: sciatic, popliteal  Prep: ChloraPrep  Patient position: supine  Patient monitoring: cardiac monitor, continuous pulse oximetry, heart rate and blood pressure  Laterality: right  Injection technique: ultrasound guided and nerve stimulator  Nerve Stimulator mAmp: 0.5        Needle  Needle type: Stimuplex   Needle gauge: 21 G  Needle length: 4 in  no peripheral nerve catheter placed  Assessment  Injection assessment: no difficulty with injection, negative aspiration for heme, no paresthesia on injection and incremental injection

## 2021-06-08 NOTE — ANESTHESIA PROCEDURE NOTES
Peripheral Block    Patient location during procedure: pre-op  Start time: 2/7/2017 9:06 AM  End time: 2/7/2017 9:11 AM  post-op analgesia per surgeon order as noted in medical record  Staffing:  Performing  Anesthesiologist: ERNESTO FAITH  Preanesthetic Checklist  Completed: patient identified, site marked, risks, benefits, and alternatives discussed, timeout performed, consent obtained, airway assessed, oxygen available, suction available, emergency drugs available and hand hygiene performed  Peripheral Block  Block type: saphenous, adductor canal block  Prep: ChloraPrep  Patient position: supine  Patient monitoring: cardiac monitor, continuous pulse oximetry, heart rate and blood pressure  Laterality: right  Injection technique: ultrasound guided          Needle  Needle type: Stimuplex   Needle gauge: 21 G  Needle length: 4 in  no peripheral nerve catheter placed  Assessment  Injection assessment: no difficulty with injection, negative aspiration for heme, no paresthesia on injection and incremental injection

## 2021-06-08 NOTE — ANESTHESIA PREPROCEDURE EVALUATION
Anesthesia Evaluation      Patient summary reviewed   No history of anesthetic complications     Airway   Mallampati: II  Neck ROM: full   Pulmonary - normal exam    breath sounds clear to auscultation  (+) asthma  a smoker  (-) shortness of breath, sleep apnea                         Cardiovascular   Exercise tolerance: > or = 4 METS  (-) angina, murmur  ECG reviewed  Rhythm: regular  Rate: normal,    no murmur      Neuro/Psych - negative ROS     Endo/Other    (-) no obesity, not pregnant     GI/Hepatic/Renal    (+) GERD, esophageal disease,            Dental - normal exam                        Anesthesia Plan  Planned anesthetic: general endotracheal and peripheral nerve block    ASA 2   Induction: intravenous   Anesthetic plan and risks discussed with: patient  Anesthesia plan special considerations: antiemetics,   Post-op plan: routine recovery

## 2021-06-08 NOTE — PROGRESS NOTES
Assessment:     Judith Moreno was seen in preoperative consultation in preparation for Right naviculocuneiform arthrodesis with calcaneal bone graft. This is a intermediate risk surgery and the patient has no increased risk for major cardiac complications based on exam and history and appears to be medically stable for the proposed procedure.      66 y.o. female with planned surgery as above.    Known risk factors for perioperative complications: None    Difficulty with intubation is not anticipated.    Cardiac Risk Estimation: low risk.     Current medications which may produce withdrawal symptoms if withheld perioperatively: none         Plan:      1. Preoperative workup as follows ECG, hemoglobin, electrolytes, glucose.  2. Change in medication regimen before surgery: none, continue medication regimen including morning of surgery, with sip of water.  3. Prophylaxis for cardiac events with perioperative beta-blockers: not indicated.  4. Invasive hemodynamic monitoring perioperatively: not indicated.  5. Deep vein thrombosis prophylaxis postoperatively:regimen to be chosen by surgical team.  6. Surveillance for postoperative MI with ECG immediately postoperatively and on postoperative days 1 and 2 AND troponin levels 24 hours postoperatively and on day 4 or hospital discharge (whichever comes first): not indicated.  7. Other measures: Right leg venous valve dysfunction-consider when deciding on DVT prophylaxis.      Subjective:      Judith Moreno is a 66 y.o. female who presents to the office today for a preoperative consultation at the request of surgeon Dr. Binh Arrington who plans on performing Right naviculocuneiform arthrodesis with calcaneal bone graft on February 7. Planned anesthesia: general and popliteal block. The patient has the following known anesthesia issues: none. Patients bleeding risk: no recent abnormal bleeding and no remote history of abnormal bleeding. Patient does not have objections to  receiving blood products if needed.    History of present illness: She has had chronic pain of the right foot for approximately 20 years on and off.  More recently she had worsening symptoms affecting her gait and she saw a foot specialist for MRI was completed.  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  It also showed some ligament laxity and overall arthritis of the MTP. She wll have series of two surgeries to repair. One surgery now and one 4 weeks later to repair the ligament and arthritic toe.  Other than recent aches and pains related to the foot, she has been feeling well.  She does get occasional reflux symptoms but otherwise review of systems is negative except for some low back and hip pain on the right side related to her foot and gait.  This is improved since use of ankle and foot brace.  No recent cold symptoms, congestion, cough.  No fever.  No recent or remote chest pain, chest pressure, palpitations or shortness of breath.  React history for Judith.  She is generally healthy the exception of some GERD.  Viewed met medications to hold in which to take leading up to surgery.  Specifically hold all NSAIDs 1 week prior to procedure.    The following portions of the patient's history were reviewed and updated as appropriate: allergies, current medications, past family history, past medical history, past social history, past surgical history and problem list.    Review of Systems  Constitutional: negative  Eyes: negative  Ears, nose, mouth, throat, and face: negative  Respiratory: negative  Cardiovascular: negative  Gastrointestinal: positive for reflux symptoms  Genitourinary:negative  Integument/breast: negative  Hematologic/lymphatic: negative  Musculoskeletal:positive for back pain and hip pain, better since brace  Neurological: positive for occasional nerve pain in right foot  Behavioral/Psych: negative  Endocrine: negative  Allergic/Immunologic: negative  "     Objective:     Visit Vitals     /76 (Patient Site: Left Arm, Patient Position: Sitting, Cuff Size: Adult Regular)     Temp 99  F (37.2  C) (Oral)     Resp 16     Ht 5' 4\" (1.626 m)     Wt 137 lb 5 oz (62.3 kg)     LMP 10/11/2006     BMI 23.57 kg/m2     General: Patient appears to be in no acute distress.  Eyes: Inferior palpebral conjunctiva clear and pink, no exudates or tearing. Sclera are white. Eyelids symmetrical, no erythema, flakiness, fasciculations, or ptosis. Pupils react equally to Light and accommodation. EOMS intact. No lid lag, no nystagmus, corneal reflex equal bilaterally, cornea and lens clear, red reflex present, optic disc cream colored with well defined borders. Retina pink, no hemorrhages or exudates.  Ears: No nodules, lesions, masses, discharge or tenderness in auricles or mastoid area. No cerumen in ear canals. Tympanic membranes pearly gray, normal bony landmarks and cone of light bilaterally, no bulges or perforations.   Oropharynx: Buccal mucosa pink, moist, no lesions. Tongue midline, spongy, pink. Uvula midline. Pharynx with no erythema, no exudates. Tonsils + 1.  Neck: Full range of motion, no pain with palpation of spine or paraspinal muscles.  Lungs: Unlabored. clear breath sounds heard throughout lung fields.   Heart: Regular rate and rhythm.  Abdomen: Soft rounded abdomen. Bowel sounds heard in all four quadrants; liver, spleen, and kidney not palpable, no tenderness on palpation of abdomen, no CVA tenderness.    Musculoskeletal: muscles appear symmetric, muscle strength appropriate (Bilateral upper and lower extremities strength 5/5) and equal bilaterally full range of active and passive motion, spine and extremities in good alignment.  Neuro: Coordinated, smooth gait, balance, rapid alternating movements, sensory functioning, and cranial nerves II-XII grossly intact. DTR's+2 bilaterally brachioradialis, knee, ankle. Rhomberg s wnl.        Predictors of intubation " difficulty:   Morbid obesity? no   Anatomically abnormal facies? no   Prominent incisors? no   Receding mandible? no   Short, thick neck? no   Neck range of motion: normal   Mallampati score: I (soft palate, uvula, fauces, and tonsillar pillars visible)   Dentition: No chipped, loose, or missing teeth.    Cardiographics  ECG: normal sinus rhythm, no blocks or conduction defects, no ischemic changes  Echocardiogram: none    Imaging  Chest x-ray: not indicated     Lab Review   See labs ordered today.    Fiordaliza Griffin CNP    This note has been dictated using voice recognition software. Any grammatical or context distortions are unintentional and inherent to the software

## 2021-06-08 NOTE — PROGRESS NOTES
Assessment:     1. Change in skin mole  Ambulatory referral to Dermatology   2. Other depression     3. Mild intermittent asthma without complication  albuterol (VENTOLIN HFA) 90 mcg/actuation inhaler   4. Dry eyes     5. Pure hypercholesterolemia  rosuvastatin (CRESTOR) 5 MG tablet       Plan:     1. Change in skin mole  Patient has multiple moles on her arms and legs back which have changed in character over the last few years patient is requesting dermatology referral which I will place.  She also has a large mummified wart on the dorsum of her mid right leg which will need debridement and then cryotherapy  - Ambulatory referral to Dermatology    2. Other depression  Patient is stable on Wellbutrin and has been for years I will refill her medication    3. Mild intermittent asthma without complication  No hospitalizations in the last 5 years patient does well for acute breakthroughs with the following  - albuterol (VENTOLIN HFA) 90 mcg/actuation inhaler; Inhale 2 puffs every 6 (six) hours as needed.  Dispense: 1 Inhaler; Refill: 3    4. Dry eyes  Patient has increased and watery eye bilaterally suspect dry eye syndrome referral to ophthalmology will be by patient who saw [facility] in the past    5. Pure hypercholesterolemia  She apparently gets mental clouding when she takes statins however she has not tried rosuvastatin I will start her at a small dose and encourage her to take it on a daily basis because I do remember her lipids are quite high even if she takes it every other day I will be satisfied  - rosuvastatin (CRESTOR) 5 MG tablet; Take 1 tablet (5 mg total) by mouth at bedtime.  Dispense: 30 tablet; Refill: 11      Subjective:   I am seeing Judith after an absence of about 5 years.  Patient formally saw me at the [place].  Her past medical history of asthma is well known to me.  Patient has not been hospitalized for acute exacerbations in the last 5 years and does well  with albuterol for acute breakthrough.  She also has noticed an increase in the number of moles on her body and changes in the one she had before she is requesting a dermatology consultation and I agree with this and I will place this for overall body check and mole check.  She also has a large mummified wart on her right leg which will need rather intensive debridement and cryotherapy.  Patient also has had high lipids in the past and has been given statins at another clinic and she says they make her mentally very clouded.  I do remember her lipid values from the past and I encourage her to take some rosuvastatin 5 mg either daily or every other day and I will prescribe this.  She needs to come in here at least once a year for medication evaluation and check and she is cognizant of that.  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    Review of Systems: A complete 14 point review of systems was obtained and is negative or as stated in the history of present illness.    Past Medical History:   Diagnosis Date     ADD (attention deficit disorder)      Asthma      Mosley's esophagus      GERD (gastroesophageal reflux disease)      Hip pain     Right Side     Low back pain     Right Side     Right foot pain      Family History   Problem Relation Age of Onset     Breast cancer Mother 80         lived to 92     Dementia Mother      Cancer Father         unknown     Past Surgical History:   Procedure Laterality Date     ANKLE ARTHROSCOPY Right 3/7/2017    Procedure: STAGE II:  RIGHT ANKLE ARTHROSCOPY ;  Surgeon: Binh Arrington MD;  Location: [facility];  Service:      ANKLE LIGAMENT RECONSTRUCTION Right 3/7/2017    Procedure: LATERAL ANKLE LIGAMENT RECONSTRUCTION WITH INTERNAL BRACE;  Surgeon: Binh Arrington MD;   Location: [facility];  Service:      GANGLION CYST EXCISION Bilateral     right hand x1, left x2     VT APPENDECTOMY      Description: Appendectomy;  Recorded: 2014;  Comments: ? with      VT  DELIVERY ONLY      Description:  Section Classical;  Recorded: 2014;  Comments: x 2 and tubal ligation     VT FUSION FOOT BONES,TRIPLE Right 2017    Procedure: STAGE 1: RIGHT NAVICULOCUNEIFORM ARTHRODESIS WITH CALCANEAL BONE GRAFT;  Surgeon: Binh Arrington MD;  Location: [facility];  Service: Orthopedics     VT HALLUX RIGIDUS W/CHEILECTOMY 1ST MP JT W/O IMPLT Right 3/7/2017    Procedure: CHEILECTOMY WITH POSSIBLE SOFT TISSUE ARTHROPLASTY;  Surgeon: Binh Arrington MD;  Location: [facility];  Service: Orthopedics     Social History     Tobacco Use     Smoking status: Former Smoker     Packs/day: 0.50     Years: 20.00     Pack years: 10.00     Last attempt to quit:      Years since quittin.4     Smokeless tobacco: Never Used   Substance Use Topics     Alcohol use: Yes     Comment: rarely     Drug use: No         Objective:   /78 (Patient Site: Right Arm, Patient Position: Sitting, Cuff Size: Adult Regular)   Pulse 83   Wt 125 lb 1.6 oz (56.7 kg)   LMP 10/11/2006   SpO2 97%   BMI 21.47 kg/m      General Appearance:  Alert, cooperative, no distress  Head:  Normocephalic, no obvious abnormality  Ears: TM anatomy normal  Eyes:  PERRL, EOM's intact, conjunctiva and corneas clear  Nose:  Nares symmetrical, septum midline, mucosa pink, no sinus tenderness  Throat:  Lips, tongue, and mucosa are moist, pink, and intact  Neck:  Supple, symmetrical, trachea midline, no adenopathy; thyroid: no enlargement, symmetric,no tenderness/mass/nodules; no carotid bruit, no JVD  Back:  Symmetrical, no curvature, ROM normal, no CVA tenderness  Chest/Breast:  No mass or tenderness  Lungs:  Clear to auscultation bilaterally, respirations unlabored   Heart:   Normal PMI, regular rate & rhythm, S1 and S2 normal, no murmurs, rubs, or gallops  Abdomen:  Soft, non-tender, bowel sounds active all four quadrants, no mass, or organomegaly  Musculoskeletal:  Tone and strength strong and symmetrical, all extremities  Lymphatic:  No adenopathy  Skin/Hair/Nails: Patient has multiple actinic keratoses and multiple sun damaged areas and a large mummified wart on the dorsum of her right leg patient was referred to dermatology for mole check and treatment of large mummified wart  Neurologic:  Alert and oriented x3, no cranial nerve deficits, normal strength and tone, gait steady  Extremities:  No edema.  Soren's sign negative.    Genitourinary: deferred  Pulses:  Equal bilaterally     I have had an Advance Directives discussion with the patient.      This note has been dictated using voice recognition software. Any grammatical or context distortions are unintentional and inherent to the the software.

## 2021-06-09 NOTE — ANESTHESIA PREPROCEDURE EVALUATION
Anesthesia Evaluation        Airway   Mallampati: II  Neck ROM: full   Pulmonary - normal exam   (+) asthma  mild,                          Cardiovascular - negative ROS and normal exam   Neuro/Psych - negative ROS     Endo/Other - negative ROS      GI/Hepatic/Renal    (+) GERD,             Dental - normal exam                        Anesthesia Plan  Planned anesthetic: peripheral nerve block and general mask    ASA 2     Anesthetic plan and risks discussed with: patient    Post-op plan: routine recovery

## 2021-06-09 NOTE — ANESTHESIA PROCEDURE NOTES
Peripheral Block    Patient location during procedure: pre-op  Start time: 3/7/2017 7:59 AM  End time: 3/7/2017 8:04 AM  post-op analgesia per surgeon order as noted in medical record  Staffing:  Performing  Anesthesiologist: DEMOND HAWTHORNE  Performing CRNA: SCHUWEILER, SHANNON MARIE  Preanesthetic Checklist  Completed: patient identified, site marked, risks, benefits, and alternatives discussed, timeout performed, consent obtained, airway assessed, oxygen available, suction available, emergency drugs available and hand hygiene performed  Peripheral Block  Block type: sciatic, popliteal  Prep: ChloraPrep  Patient position: supine  Patient monitoring: cardiac monitor, continuous pulse oximetry, heart rate and blood pressure  Laterality: right  Injection technique: ultrasound guided    Ultrasound used to visualize needle placement in proximity to nerve being blocked: yes   Permanent ultrasound image captured for medical record    Needle  Needle type: echogenic   Needle gauge: 22 G  Needle length: 4 in  no peripheral nerve catheter placed  Assessment  Injection assessment: no difficulty with injection, negative aspiration for heme, no paresthesia on injection and incremental injection

## 2021-06-09 NOTE — ANESTHESIA POSTPROCEDURE EVALUATION
Patient: Judith Moreno  #1  STAGE II:  RIGHT ANKLE ARTHROSCOPY , LATERAL ANKLE LIGAMENT RECONSTRUCTION WITH INTERNAL BRACE, CHEILECTOMY WITH POSSIBLE SOFT TISSUE ARTHROPLASTY  Anesthesia type: general    Patient location: PACU  Last vitals:   Vitals:    03/07/17 1300   BP: 112/72   Pulse: 84   Resp: 20   Temp:    SpO2: 97%     Post vital signs: stable  Level of consciousness: awake and responds to simple questions  Post-anesthesia pain: pain controlled  Post-anesthesia nausea and vomiting: no  Pulmonary: unassisted, return to baseline  Cardiovascular: stable and blood pressure at baseline  Hydration: adequate  Anesthetic events: no    QCDR Measures:  ASA# 11 - Rebeka-op Cardiac Arrest: ASA11B - Patient did NOT experience unanticipated cardiac arrest  ASA# 12 - Rebeka-op Mortality Rate: ASA12B - Patient did NOT die  ASA# 13 - PACU Re-Intubation Rate: NA - No ETT / LMA used for case  ASA# 10 - Composite Anes Safety: ASA10A - No serious adverse event  ASA# 38 - New Corneal Injury: ASA38A - No new exposure keratitis or corneal abrasion in PACU    Additional Notes:

## 2021-06-09 NOTE — ANESTHESIA PROCEDURE NOTES
Peripheral Block    Patient location during procedure: pre-op  Start time: 3/7/2017 8:05 AM  End time: 3/7/2017 8:08 AM  post-op analgesia per surgeon order as noted in medical record  Staffing:  Performing  Anesthesiologist: DEMOND HAWTHORNE  Performing CRNA: SCHUWEILER, SHANNON MARIE  Preanesthetic Checklist  Completed: patient identified, site marked, risks, benefits, and alternatives discussed, timeout performed, consent obtained, airway assessed, oxygen available, suction available, emergency drugs available and hand hygiene performed  Peripheral Block  Block type: saphenous, adductor canal block  Prep: ChloraPrep  Patient position: supine  Patient monitoring: cardiac monitor, continuous pulse oximetry, heart rate and blood pressure  Laterality: right          Needle  Needle type: echogenic   Needle gauge: 22 G  Needle length: 4 in  no peripheral nerve catheter placed  Assessment  Injection assessment: no difficulty with injection, negative aspiration for heme, no paresthesia on injection and incremental injection

## 2021-06-13 NOTE — PROGRESS NOTES
ASSESSMENT & PLAN:  1. Health care maintenance  The female exam, update fasting labs today and let patient know when results available.  Up-to-date on Pap, mammogram and colonoscopy.  - Basic Metabolic Panel  - HM2(CBC w/o Differential)  - Thyroid Cascade  - Lipid Cascade FASTING  - Vitamin D, Total (25-Hydroxy)    2. Hyperlipidemia  Continue medication regimen, follow for labs- atorvastatin (LIPITOR) 10 MG tablet; TAKE 1 TABLET(10 MG) BY MOUTH DAILY  Dispense: 90 tablet; Refill: 3    3. Mosley's esophagus with dysplasia  Patient having worsening symptoms, change medications today and try twice a day dosing.  Follow-up in approximately 1 month and let us know how symptoms are doing.  Would consider repeat EGD if symptoms not improving as expected.  - pantoprazole (PROTONIX) 20 MG tablet; Take 1 tablet (20 mg total) by mouth 2 (two) times a day.  Dispense: 60 tablet; Refill: 1    4. Attention or concentration deficit  Mood under good control with current regimen.  PHQ 9 and TIKA 7 updated today, continue current dose.  - buPROPion (WELLBUTRIN XL) 300 MG 24 hr tablet; TAKE 1 TABLET BY MOUTH DAILY  Dispense: 90 tablet; Refill: 3    5. Asthma  Asthma under good control on current regimen, continue current medications.  - montelukast (SINGULAIR) 10 mg tablet; TAKE 1 TABLET BY MOUTH DAILY  Dispense: 90 tablet; Refill: 0    6. Family history of osteoporosis  - DXA Bone Density Scan; Future        There are no Patient Instructions on file for this visit.    Orders Placed This Encounter   Procedures     DXA Bone Density Scan     Standing Status:   Future     Standing Expiration Date:   10/31/2018     Order Specific Question:   Can the procedure be changed per Radiologist protocol?     Answer:   Yes     Influenza High Dose, Seasonal 65+ yrs     Pneumococcal polysaccharide vaccine 23-valent 3 yo or older, subq/IM     Basic Metabolic Panel     HM2(CBC w/o Differential)     Thyroid Cascade     Lipid Coldiron FASTING     Order  Specific Question:   Fasting is required?     Answer:   Yes     Vitamin D, Total (25-Hydroxy)     Medications Discontinued During This Encounter   Medication Reason     atorvastatin (LIPITOR) 10 MG tablet Reorder     buPROPion (WELLBUTRIN XL) 300 MG 24 hr tablet Reorder     montelukast (SINGULAIR) 10 mg tablet Reorder     lansoprazole (PREVACID) 30 MG capsule Alternate therapy           General  Immunizations reviewed and updated .  Alcohol use, safety and moderation discussed.  Recommended adequate calcium intake/osteoporosis prevention.  Discussed colon cancer screening at age 50, 45 if -American.  Diet and exercise reviewed, including goal of aerobic exercise 30-90 minutes most days of the week, moderation of portions sizes, avoiding eating out and fast food and increase in fruits and vegetables.  Discussed safe sex practices.  Discussed & recommended seat belt (& motorcycle helmet) use.  Discussed & recommended smoke detector.  Discussed sun protection.  Discussed weight management.    The following are part of a depression follow up plan for the patient:  taking history of mental health management    CHIEF COMPLAINT:  Chief Complaint   Patient presents with     Annual Exam     pt is not fasting        HISTORY OF PRESENT ILLNESS:  Judith is a 67 y.o. female presenting to the clinic today for a physical examination.  Patient feeling well today without questions or concerns.  She does need some medication refills today.Monitoring wound for infection.  Patient currently medicated for Hyperlipidemia, asthma, Mosley's esophagus history, difficulty concentrating.  Medications are working optimally with exception of reflux.  She has been having some increased GERD symptoms lately and wants to discuss medication therapy for that.  Currently she uses Prevacid and it is not working as well as she gets towards nighttime each day.  Due for flu shot and pneumonia vaccine today.  Other health maintenance activities are  up-to-date.  We will collect fasting lab work today.    REVIEW OF SYSTEMS:   All other systems are negative.    PFSH:  Social History:  The patient reports that there is not domestic violence in her life.     Regular Exercise: yes  Sunscreen Use: yes  Healthy Diet: yes  Dental Visits Regularly: yes  Sexually active: no  Colonoscopy: yes  Prevention of Osteoporosis: yes   Last DXA: yes    Social History     Social History     Marital status: Single     Spouse name: N/A     Number of children: N/A     Years of education: N/A     Occupational History     Not on file.     Social History Main Topics     Smoking status: Former Smoker     Packs/day: 0.50     Years: 20.00     Quit date: 1986     Smokeless tobacco: Never Used     Alcohol use Yes      Comment: rarely     Drug use: No     Sexual activity: Not on file     Other Topics Concern     Not on file     Social History Narrative       History   Smoking Status     Former Smoker     Packs/day: 0.50     Years: 20.00     Quit date: 1986   Smokeless Tobacco     Never Used       Family History:  Family History   Problem Relation Age of Onset     Breast cancer Mother 80       Past Medical History:  Active Ambulatory Problems     Diagnosis Date Noted     Mild Persistent Asthma      Decreased Concentrating Ability      Short Segment Mosley's Esophagus      Hyperlipidemia 03/24/2017     Resolved Ambulatory Problems     Diagnosis Date Noted     No Resolved Ambulatory Problems     Past Medical History:   Diagnosis Date     ADD (attention deficit disorder)      Asthma      Mosley's esophagus      GERD (gastroesophageal reflux disease)      Hip pain      Low back pain      Right foot pain        Allergies   Allergen Reactions     Vicodin [Hydrocodone-Acetaminophen] Nausea And Vomiting       Immunization History   Administered Date(s) Administered     Influenza high dose, seasonal 10/31/2017     Influenza, seasonal,quad inj 36+ mos 11/12/2015, 09/26/2016     Pneumo Conj 13-V  "(2010&after) 2015     Pneumo Polysac 23-V 10/31/2017     Td, adult adsorbed, PF 2016     Tdap 2006     ZOSTER 2016     Immunization status: up to date and documented    Gynecologic History:  Patient's last menstrual period was 10/11/2006.  Contraception:None  Last Pap: 2016. Results were: normal  Last mammogram: . Results were: normal    OB History:  OB History      Para Term  AB Living    2 2 2       SAB TAB Ectopic Multiple Live Births                  Surgical History:  Past Surgical History:   Procedure Laterality Date     ANKLE ARTHROSCOPY Right 3/7/2017    Procedure: STAGE II:  RIGHT ANKLE ARTHROSCOPY ;  Surgeon: Binh Arrington MD;  Location: [facility];  Service:      ANKLE LIGAMENT RECONSTRUCTION Right 3/7/2017    Procedure: LATERAL ANKLE LIGAMENT RECONSTRUCTION WITH INTERNAL BRACE;  Surgeon: Binh Arrington MD;  Location: [facility];  Service:      GANGLION CYST EXCISION Bilateral     right hand x1, left x2     NC APPENDECTOMY      Description: Appendectomy;  Recorded: 2014;  Comments: ? with      NC  DELIVERY ONLY      Description:  Section Classical;  Recorded: 2014;  Comments: x 2 and tubal ligation     NC FUSION FOOT BONES,TRIPLE Right 2017    Procedure: STAGE 1: RIGHT NAVICULOCUNEIFORM ARTHRODESIS WITH CALCANEAL BONE GRAFT;  Surgeon: Binh Arrington MD;  Location: [facility];  Service: Orthopedics     NC HALLUX RIGIDUS W/CHEILECTOMY 1ST MP JT W/O IMPLT Right 3/7/2017    Procedure: CHEILECTOMY WITH POSSIBLE SOFT TISSUE ARTHROPLASTY;  Surgeon: Binh Arrington MD;  Location: [facility];  Service: Orthopedics       VITALS:  Vitals:    10/31/17 0836   BP: 118/74   Patient Site: Left Arm   Patient Position: Sitting   Cuff Size: Adult Regular   Pulse: 70   Resp: 14   Temp: 97.9  F (36.6  C)   TempSrc: Oral   Weight: 137 lb (62.1 kg)   Height: 5' 4\" (1.626 m)     Wt Readings " from Last 3 Encounters:   10/31/17 137 lb (62.1 kg)   03/06/17 137 lb 5.6 oz (62.3 kg)   02/07/17 131 lb 9.8 oz (59.7 kg)       PHYSICAL EXAM:  General Appearance: Reveals an alert, cooperative female.   Vitals:  Per nursing notes.  Head: Normocephalic, without obvious abnormality, atraumatic   Eyes: PERRL, conjunctiva/corneas clear, EOM's intact   Ears: Normal TM's and external ear canals, both ears   Oropharynx: Nares normal, septum midline, mucosa normal, no drainage, lips, and tongue normal   Neck: Supple, symmetrical, trachea midline, no adenopathy; thyroid: not enlarged, symmetric, no tenderness/mass/nodules   Back: Symmetric, no curvature, ROM normal, no CVA tenderness   Lungs: Clear to auscultation bilaterally, respirations unlabored, no wheezing or rales   Heart: Regular rate and rhythm, S1 and S2 normal, no murmur, rub, or gallop, no carotid bruits  Breasts: No breast masses, tenderness, asymmetry, or nipple discharge.   Abdomen: Soft, non-tender, no masses, no organomegaly,  Pelvic: declined  Extremities: Extremities normal, atraumatic, no cyanosis or edema   Skin: Skin color, texture, turgor normal, no rashes or lesions   Lymph nodes: Cervical, supraclavicular, and axillary nodes normal.  Neurologic: Normal   Psych: Normal affect. Does not appear anxious or depressed.     DATA REVIEWED:  Additional History from Old Records or Another Person Summarized (2 total): None.     Decision to Obtain Extra information (1 total): None.     Radiology Tests Summarized and Ordered (XRAY/CT/MRI/DXA) (1 total): None.    Labs Reviewed and Ordered (1 total): None.    Medicine Tests Summarized and Ordered (EKG/ECHO/COLONOSCOPY/EGD) (1 total): None.    Independent Review of EKG or X-Ray (2 each): None.    The visit lasted a total of 40 minutes face to face with the patient. Over 50% of the time was spent conducting the physical and in coordination of care.      MEDICATIONS:  Current Outpatient Prescriptions   Medication Sig  Dispense Refill     acetaminophen (TYLENOL) 650 MG CR tablet Take 650 mg by mouth every 8 (eight) hours as needed for pain.       atorvastatin (LIPITOR) 10 MG tablet TAKE 1 TABLET(10 MG) BY MOUTH DAILY 90 tablet 3     biotin 1 mg tablet Take 1,000 mcg by mouth 3 (three) times a day.       buPROPion (WELLBUTRIN XL) 300 MG 24 hr tablet TAKE 1 TABLET BY MOUTH DAILY 90 tablet 3     CALCIUM CARBONATE (CALCIUM 500 ORAL) Take 500 mg by mouth daily.        CHOLECALCIFEROL, VITAMIN D3, (D3-2000 ORAL) Take 1 capsule by mouth daily.        LUTEIN ORAL Take 1 tablet by mouth daily.        montelukast (SINGULAIR) 10 mg tablet TAKE 1 TABLET BY MOUTH DAILY 90 tablet 0     albuterol (VENTOLIN HFA) 90 mcg/actuation inhaler Inhale 2 puffs every 6 (six) hours as needed. 1 Inhaler 3     morphine (MS CONTIN) 15 MG 12 hr tablet Take 1 tablet (15 mg total) by mouth 2 (two) times a day. 12 tablet 0     morphine (MS CONTIN) 15 MG 12 hr tablet Take 1 tablet (15 mg total) by mouth 2 (two) times a day. 12 tablet 0     pantoprazole (PROTONIX) 20 MG tablet Take 1 tablet (20 mg total) by mouth 2 (two) times a day. 60 tablet 1     No current facility-administered medications for this visit.        Total Data Points:     Fiordaliza Griffin CNP    This note has been dictated using voice recognition software. Any grammatical or context distortions are unintentional and inherent to the software

## 2021-06-15 PROBLEM — E78.5 HYPERLIPIDEMIA: Status: ACTIVE | Noted: 2017-03-24

## 2021-06-17 ENCOUNTER — RECORDS - HEALTHEAST (OUTPATIENT)
Dept: FAMILY MEDICINE | Facility: CLINIC | Age: 71
End: 2021-06-17

## 2021-06-17 NOTE — PATIENT INSTRUCTIONS - HE
Patient Instructions by Nehemiah Akers MD at 3/26/2019  7:30 AM     Author: Nehemiah Akers MD Service: -- Author Type: Physician    Filed: 3/26/2019  8:02 AM Encounter Date: 3/26/2019 Status: Addendum    : Nehemiah Akers MD (Physician)    Related Notes: Original Note by Nehemiah Akers MD (Physician) filed at 3/26/2019  7:59 AM       CHECK FOR COVERAGE FOR [employer]      Patient Education   Signs of Hearing Loss  Hearing loss is a problem shared by many people. In fact, it is one of the most common health conditions, particularly as people age. Most people over age 65 have some hearing loss, and by age 80, almost everyone does. Because hearing loss usually occurs slowly over the years, you may not realize your hearing ability has gotten worse.       Have your hearing checked  Contact your [facility] care provider if you:    Have to strain to hear normal conversation.    Have to watch other peoples faces very carefully to follow what theyre saying.    Need to ask people to repeat what theyve said.    Often misunderstand what people are saying.    Turn the volume of the television or radio up so high that others complain.    Feel that people are mumbling when theyre talking to you.    Find that the effort to hear leaves you feeling tired and irritated.    Notice, when using the phone, that you hear better with 1 ear than the other.    6643-0622 [employer]. [address] [place] [address]. All rights reserved. This information is not intended as a substitute for professional medical care. Always follow your healthcare professional's instructions.         Patient Education   Understanding Advance Care Planning  Advance care planning is the process of deciding ones own future medical care. It helps ensure that if you cant speak for yourself, your wishes can still be carried out. The plan is a series of legal documents that note a persons wishes. The documents vary by state. Advance  care planning may be done when a person has a serious illness that is expected to get worse. It may be done before major surgery. And it can help you and your family be prepared in case of a major illness or injury. Advance care planning helps with making decisions at these times.       A health care proxy is a person who acts as the voice of a patient when the patient cant speak for himself or herself. The name of this role varies by state. It may be called a Durable Medical Power of  or Durable Power of  for Healthcare. It may be called an agent, surrogate, or advocate. Or it may be called a representative or decision maker. It is an official duty that is identified by a legal document. The document also varies by state.    Why Is Advance Care Planning Important?  If a person communicates their healthcare wishes:    They will be given medical care that matches their values and goals.    Their family members will not be forced to make decisions in a crisis with no guidance.  Creating a Plan  Making an advance care plan is often done in 3 steps:    Thinking about ones wishes. To create an advance care plan, you should think about what kind of medical treatment you would want if you lose the ability to communicate. Are there any situations in which you would refuse or stop treatment? Are there therapies you would want or not want? And whom do you want to make decisions for you? There are many places to learn more about how to plan for your care. Ask your doctor or  for resources.    Picking a health care proxy. This means choosing a trusted person to speak for you only when you cant speak for yourself. When you cannot make medical decisions, your proxy makes sure the instructions in your advance care plan are followed. A proxy does not make decisions based on his or her own opinions. They must put aside those opinions and values if needed, and carry out your wishes.    Filling out the  legal documents. There are several kinds of legal documents for advance care planning. Each one tells health care providers your wishes. The documents may vary by state. They must be signed and may need to be witnessed or notarized. You can cancel or change them whenever you wish. Depending on your state, the documents may include a Healthcare Proxy form, Living Will, Durable Medical Power of , Advance Directive, or others.  The Familys Role  The best help a family can give is to support their loved ones wishes. Open and honest communication is vital. Family should express any concerns they have about the patients choices while the patient can still make decisions.    6050-1429 [employer]. [address] [place] [address], [place] [address]. All rights reserved. This information is not intended as a substitute for professional medical care. Always follow your healthcare professional's instructions.         Also, [facility] offers a free, downloadable health care directive that allows you to share your treatment choices and personal preferences if you cannot communicate your wishes. It also allows you to appoint another person (called a health care agent) to make health care decisions if you are unable to do so. You can download an advance directive by going here: [email or web address]     Patient Education   Personalized Prevention Plan  You are due for the preventive services outlined below.  Your care team is available to assist you in scheduling these services.  If you have already completed any of these items, please share that information with your care team to update in your medical record.  Health Maintenance   Topic Date Due   ? ASTHMA FOLLOW-UP  1950   ? ZOSTER VACCINES (2 of 3) 11/21/2016   ? DXA SCAN  04/25/2017   ? MAMMOGRAM  10/11/2018   ? COLONOSCOPY  04/22/2019 (Originally 4/21/2019)   ? ASTHMA CONTROL TEST  02/08/2020   ? FALL RISK  ASSESSMENT  02/08/2020   ? ADVANCE DIRECTIVES DISCUSSED WITH PATIENT  09/26/2021   ? TD 18+ HE  09/26/2026   ? PNEUMOCOCCAL POLYSACCHARIDE VACCINE AGE 65 AND OVER  Completed   ? INFLUENZA VACCINE RULE BASED  Completed   ? PNEUMOCOCCAL CONJUGATE VACCINE FOR ADULTS (PCV13 OR PREVNAR)  Completed

## 2021-06-18 NOTE — LETTER
Letter by Fiordaliza Griffin FNP at      Author: Fiordaliza Griffin FNP Service: -- Author Type: --    Filed:  Encounter Date: 2/8/2019 Status: (Other)         [facility] MEDICINE/OB  02/08/19    Patient: Judith Moreno  YOB: 1950  Medical Record Number: [id number]                                                                  Opioid / Opioid Plus Controlled Substance Agreement    I understand that my care provider has prescribed an opioid (narcotic) controlled substance to help manage my condition(s). I am taking this medicine to help me function or work. I know this is strong medicine, and that it can cause serious side effects. Opioid medicine can be sedating, addicting and may cause a dependency on the drug. They can affect my ability to drive or think, and cause depression. They need to be taken exactly as prescribed. Combining opioids with certain medicines or chemicals (such as cocaine, sedatives and tranquilizers, sleeping pills, meth) can be dangerous or even fatal. Also, if I stop opioids suddenly, I may have severe withdrawal symptoms. Last, I understand that opioids do not work for all types of pain nor for all patients. If not helpful, I may be asked to stop them.        The risks, benefits, and side effects of these medicine(s) were explained to me. I agree that:    1. I will take part in other treatments as advised by my care team. This may be psychiatry or counseling, physical therapy, behavioral therapy, group treatment or a referral to a pain clinic. I will reduce or stop my medicine when my care team tells me to do so.  2. I will take my medicines as prescribed. I will not change the dose or schedule unless my care team tells me to. There will be no refills if I run out early.  I may be contactedwithout warning and asked to complete a urine drug test or pill count at any time.   3. I will keep all my appointments, and understand this is part of the monitoring of opioids. My care team may  require an office visit for EVERY opioid/controlled substance refill. If I miss appointments or dont follow instructions, my care team may stop my medicine.  4. I will not ask other providers to prescribe controlled substances, and I will not accept controlled substances from other people. If I need another prescribed controlled substance for a new reason, I will tell my care team within 1 business day.  5. I will use one pharmacy to fill all of my controlled substance prescriptions, and it is up to me to make sure that I do not run out of my medicines on weekends or holidays. If my care team is willing to refill my opioid prescription without a visit, I must request refills only during office hours, refills may take up to 3 days to process, and it may take up to 5 to 7 days for my medicine to be mailed and ready at my pharmacy. Prescriptions will not be mailed anywhere except my pharmacy.        [id number]  Rev 12/18         Registration to scan to EHR                             Page 1 of 2               Controlled Substance Agreement Opioid        [facility] MEDICINE/OB  02/08/19  Patient: Judith Moreno  YOB: 1950  Medical Record Number: [id number]                                                                  6. I am responsible for my prescriptions. If the medicine/prescription is lost or stolen, it will not be replaced. I also agree not to share controlled substance medicines with anyone.  7. I agree to not use ANY illegal or recreational drugs. This includes marijuana, cocaine, bath salts or other drugs. I agree not to use alcohol unless my care team says I may.          I agree to give urine samples whenever asked. If I dont give a urine sample, the care team may stop my medicine.    8. If I enroll in the [place] Medical Marijuana program, I will tell my care team. I will also sign an agreement to share my medical records with my care team.   9. I will bring in my list of medicines (or  my medicine bottles) each time I come to the clinic.   10. I will tell my care team right away if I become pregnant or have a new medical problem treated outside of my regular clinic.  11. I understand that this medicine can affect my thinking and judgment. It may be unsafe for me to drive, use machinery and do dangerous tasks. I will not do any of these things until I know how the medicine affects me. If my dose changes, I will wait to see how it affects me. I will contact my care team if I have concerns about medicine side effects.    I understand that if I do not follow any of the conditions above, my prescriptions or treatment may be stopped.      I agree that my provider, clinic care team, and pharmacy may work with any city, state or federal law enforcement agency that investigates the misuse, sale, or other diversion of my controlled medicine. I will allow my provider to discuss my care with or share a copy of this agreement with any other treating provider, pharmacy or emergency room where I receive care. I agree to give up (waive) any right of privacy or confidentiality with respect to these consents.     I have read this agreement and have asked questions about anything I did not understand.      ________________________________________________________________________  Patient signature - Date/Time -  Judith Moreno                                      ________________________________________________________________________  Witness signature                                                            ________________________________________________________________________  Provider signature - MARY Daugherty      [id number]  Rev 12/18         Registration to scan to EHR                         Page 2 of 2                   Controlled Substance Agreement Opioid           Page 1 of 2  Opioid Pain Medicines (also known as Narcotics)  What You Need to Know    What are opioids?   Opioids are pain medicines that must  be prescribed by a doctor.  They are also known as narcotics.    Examples are:     morphine (MS Contin, Kaylin)    oxycodone (Oxycontin)    oxycodone and acetaminophen (Percocet)    hydrocodone and acetaminophen (Vicodin, Norco)     fentanyl patch (Duragesic)     hydromorphone (Dilaudid)     methadone     What do opioids do well?   Opioids are best for short-term pain after a surgery or injury. They also work well for cancer pain. Unlike other pain medicines, they do not cause liver or kidney failure or ulcers. They may help some people with long-lasting (chronic) pain.     What do opioids NOT do well?   Opioids never get rid of pain entirely, and they do not work well for most patients with chronic pain. Opioids do not reduce swelling, one of the causes of pain. They also dont work well for nerve pain.                           For informational purposes only.  Not to replace the advice of your care provider.  Copyright 201 [facility]. All right reserved. "[employer] 879229-Bah 02/18.      Page 2 of 2    Risks and side effects   Talk to your doctor before you start or decide to keep taking one of these medicines. Side effects include:    Lowering your breathing rate enough to cause death    Overdose, including death, especially if taking higher than prescribed doses    Long-term opioid use    Worse depression symptoms; less pleasure in things you usually enjoy    Feeling tired or sluggish    Slower thoughts or cloudy thinking    Being more sensitive to pain over time; pain is harder to control    Trouble sleeping or restless sleep    Changes in hormone levels (for example, less testosterone)    Changes in sex drive or ability to have sex    Constipation    Unsafe driving    Itching and sweating    Feeling dizzy    Nausea, vomiting and dry mouth    What else should I know about opioids?  When someone takes opioids for too long or too often, they become dependent. This means that if you stop or reduce  the medicine too quickly, you will have withdrawal symptoms.    Dependence is not the same as addiction. Addiction is when people keep using a substance that harms their body, their mind or their relations with others. If you have a history of drug or alcohol abuse, taking opioids can cause a relapse.    Over time, opioids dont work as well. Most people will need higher and higher doses. The higher the dose, the more serious the side effects. We dont know the long-term effects of opioids.      Prescribed opioids aren't the best way to manage chronic pain    Other ways to manage pain include:      Ibuprofen or acetaminophen.  You should always try this first.      Treat health problems that may be causing pain.      acupuncture or massage, deep breathing, meditation, visual imagery, aromatherapy.      Use heat or ice at the pain site      Physical therapy and exercise      Stop smoking      See a counselor or therapist                                                  People who have used opioids for a long time may have a lower quality of life, worse depression, higher levels of pain and more visits to doctors.    Never share your opioids with others. Be sure to store opioids in a secure place, locked if possible.Young children can easily swallow them and overdose.     You can overdose on opioids.  Signs of overdose include decrease or loss of consciousness, slowed breathing, trouble waking and blue lips.  If someone is worried about overdose, they should call 911.    If you are at risk for overdose, you may get naloxone (Narcan, a medicine that reverses the effects of opioids.  If you overdose, a friend or family member can give you Narcan while waiting for the ambulance.  They need to know the signs of overdose and how to give Narcan.    While you're taking opioids:    Don't use alcohol or street drugs. Taking them together can cause death.    Don't take any of these medicines unless your doctor says its okay.   Taking these with opioids can cause death.    Benzodiazepines (such as lorazepam         or diazepam)    Muscle relaxers (such as cyclobenzaprine)    sleeping pills    other opioids    Safe disposal of opioids  Find your area drug take-back program, your pharmacy mail-back program, buy a special disposal bag (such as Deterra) from your pharmacy or flush them down the toilet.  Use the guidelines at:  www.fda.gov/drugs/resourcesforyou

## 2021-06-19 NOTE — LETTER
Letter by Nehemiah Akers MD at      Author: Nehemiah Akers MD Service: -- Author Type: --    Filed:  Encounter Date: 4/25/2019 Status: (Other)         Asthma Action Plan    Patient Name: Judith Moreno  Patient YOB: 1950    Doctor's Name: Nehemiah Akers    Emergency Contact:              Severity Classification: Intermittent    What triggers my asthma: colds, dust, weather, air pollution and pollen    GREEN ZONE: Doing Well   No cough, wheeze, chest tightness or shortness of breath during the day or night  Can do your usual activities    Take these medicines before exercise if your asthma is exercise-induced:  Medicine How Much to Take When to take it   albuterol  (also known as ProAir, Ventolin and Proventil) 2 puffs 15-30 minutes prior to exercise or sports     YELLOW ZONE: Asthma is Getting Worse   Cough, wheeze, chest tightness or shortness of breath or  Waking at night due to asthma, or  Can do some, but not all, usual activities.    Keep taking green zone medications and add quick-relief medicine:  Quick Relief Medicine How Much to Take When to take it   albuterol  (also known as ProAir, Ventolin and Proventil) 2 puffs every 4 hours as needed     If you do not feel better and your symptoms do not return to the green zone after one hour of the quick relief medication, then:    Take quick relief treatment again. Call your clinician within 1 hour.    Contact your clinician if you are using quick relief medication more than 2 times per week.    RED ZONE: Medical Alert!   Very short of breath, or  Quick relief medications have not helped, or  Cannot do usual activities, or  Symptoms are same or worse after 24 hours in the Yellow Zone.    Continue green zone medicines and add:  Quick Relief Medicine Dose When to take it   albuterol  (also known as ProAir, Ventolin and Proventil) 2 puffs may repeat every 20 minutes for up to 1 hour     IF ANY OF THESE ARE HAPPENING, SEEK EMERGENCY HELP AND  CALL 911!   You are struggling to breathe and are uncomfortable or  There is simply no clear improvement and you are worried about how to get through the next 30 minutes or  Trouble walking and talking due to shortness of breath, or  Lips or fingernails are blue    Provider signature:  Electronically Signed by Nehemiah Akers   Date: 04/25/19

## 2021-06-21 ENCOUNTER — COMMUNICATION - HEALTHEAST (OUTPATIENT)
Dept: FAMILY MEDICINE | Facility: CLINIC | Age: 71
End: 2021-06-21

## 2021-06-21 DIAGNOSIS — K22.719 BARRETT'S ESOPHAGUS WITH DYSPLASIA: ICD-10-CM

## 2021-06-21 DIAGNOSIS — J45.20 MILD INTERMITTENT ASTHMA WITHOUT COMPLICATION: ICD-10-CM

## 2021-06-21 DIAGNOSIS — R41.840 ATTENTION OR CONCENTRATION DEFICIT: ICD-10-CM

## 2021-06-21 DIAGNOSIS — E78.5 HYPERLIPIDEMIA, UNSPECIFIED HYPERLIPIDEMIA TYPE: ICD-10-CM

## 2021-06-21 DIAGNOSIS — E78.00 PURE HYPERCHOLESTEROLEMIA: ICD-10-CM

## 2021-06-21 RX ORDER — LANSOPRAZOLE 30 MG/1
30 CAPSULE, DELAYED RELEASE ORAL DAILY
Qty: 90 CAPSULE | Refills: 0 | Status: SHIPPED | OUTPATIENT
Start: 2021-06-21 | End: 2021-09-20

## 2021-06-21 RX ORDER — BUPROPION HYDROCHLORIDE 300 MG/1
300 TABLET ORAL EVERY MORNING
Qty: 90 TABLET | Refills: 0 | Status: SHIPPED | OUTPATIENT
Start: 2021-06-21

## 2021-06-21 RX ORDER — MONTELUKAST SODIUM 10 MG/1
10 TABLET ORAL DAILY
Qty: 90 TABLET | Refills: 0 | Status: SHIPPED | OUTPATIENT
Start: 2021-06-21 | End: 2021-09-20

## 2021-06-23 NOTE — TELEPHONE ENCOUNTER
RN cannot approve Refill Request    RN can NOT refill this medication PCP messaged that patient is overdue for Office Visit.       Roxann Hurley, [facility] Triage/Med Refill 2/5/2019    Requested Prescriptions   Pending Prescriptions Disp Refills     lansoprazole (PREVACID) 30 MG capsule [Pharmacy Med Name: Lansoprazole Oral Capsule Delayed Release 30 MG] 90 capsule 1     Sig: TAKE ONE CAPSULE BY MOUTH ONE TIME DAILY    GI Medications Refill Protocol Failed - 2/3/2019  8:42 AM       Failed - PCP or prescribing provider visit in last 12 or next 3 months.    Last office visit with prescriber/PCP: Visit date not found OR same dept: Visit date not found OR same specialty: Visit date not found  Last physical: 9/26/2016 Last MTM visit: Visit date not found   Next visit within 3 mo: Visit date not found  Next physical within 3 mo: Visit date not found  Prescriber OR PCP: Amy Keita MD  Last diagnosis associated with med order: 1. Mosley's esophagus with dysplasia  - lansoprazole (PREVACID) 30 MG capsule [Pharmacy Med Name: Lansoprazole Oral Capsule Delayed Release 30 MG]; TAKE ONE CAPSULE BY MOUTH ONE TIME DAILY   Dispense: 90 capsule; Refill: 1    If protocol passes may refill for 12 months if within 3 months of last provider visit (or a total of 15 months).

## 2021-06-23 NOTE — TELEPHONE ENCOUNTER
Cannot refill as patient has not been seen since 2017.  She needs to make an appointment to get a medication refill

## 2021-06-23 NOTE — PROGRESS NOTES
ASSESSMENT:  1. Mild intermittent asthma without complication  Influenza High Dose, Seasonal    montelukast (SINGULAIR) 10 mg tablet    albuterol (VENTOLIN HFA) 90 mcg/actuation inhaler    Pneumococcal conjugate vaccine 13-valent 6wks-17yrs; >50yrs   2. Mosley's esophagus with dysplasia  lansoprazole (PREVACID) 30 MG capsule   3. Decreased Concentrating Ability     4. Hyperlipidemia, unspecified hyperlipidemia type  Lipid [place] FASTING   5. Seasonal allergic rhinitis, unspecified trigger  desloratadine (CLARINEX) 5 mg tablet       PLAN:  Continue medications as ordered, flu shot and pneumococcal vaccine given today.  Come back for fasting labs with your physical or sooner.  Let us know if focus becomes an issue again could restart Wellbutrin at that time.  Control of allergies and would like to try prescription allergy medication this spring.  This was provided, also discussed use of Flonase.  No problem-specific Assessment & Plan notes found for this encounter.      There are no Patient Instructions on file for this visit.    Orders Placed This Encounter   Procedures     Influenza High Dose, Seasonal     Pneumococcal conjugate vaccine 13-valent 6wks-17yrs; >50yrs     Lipid [place] FASTING     Standing Status:   Future     Standing Expiration Date:   2/8/2020     Order Specific Question:   Fasting is required?     Answer:   Yes     Medications Discontinued During This Encounter   Medication Reason     acetaminophen (TYLENOL) 650 MG CR tablet Therapy completed     atorvastatin (LIPITOR) 10 MG tablet Therapy completed     buPROPion (WELLBUTRIN XL) 300 MG 24 hr tablet Therapy completed     buPROPion (WELLBUTRIN XL) 300 MG 24 hr tablet Therapy completed     morphine (MS CONTIN) 15 MG 12 hr tablet Therapy completed     morphine (MS CONTIN) 15 MG 12 hr tablet Therapy completed     buPROPion (WELLBUTRIN XL) 300 MG 24 hr tablet Therapy completed     pantoprazole (PROTONIX) 20 MG tablet Therapy completed     lansoprazole  (PREVACID) 30 MG capsule Reorder     montelukast (SINGULAIR) 10 mg tablet Reorder     albuterol (VENTOLIN HFA) 90 mcg/actuation inhaler Reorder       No Follow-up on file.    CHIEF COMPLAINT:  Chief Complaint   Patient presents with     Medication Management     refills       HISTORY OF PRESENT ILLNESS:  Judith is a 68 y.o. female Patient has upcoming physical a needs refill today.  No insurance for about a year so now is coming back to the health maintenance activities.  Is not fasting for lab work so we will do that next time.  Did place a future lipid order she could come back earlier.  She did stop her atorvastatin when she did not have insurance.  Approximately to 12 months ago.  Asthma good control.  She knows changes with mother.  Updated asthma action plan and control test today.  Discussed allergy control as patient does have worsening allergy symptoms in the spring and she is wondering what she can take.  She previously was on Wellbutrin for focus and concentration but she stopped this as well and is doing well without it.  Would like to continue lansoprazole for control of reflux and history of Mosley's esophagus.  Update vaccines as needed today.    REVIEW OF SYSTEMS:        All other systems are negative  PFSH:  Reviewed, no changes      TOBACCO USE:  Social History     Tobacco Use   Smoking Status Former Smoker     Packs/day: 0.50     Years: 20.00     Pack years: 10.00     Last attempt to quit:      Years since quittin.1   Smokeless Tobacco Never Used       VITALS:  Vitals:    19 1643   BP: 127/80   Patient Site: Left Arm   Patient Position: Sitting   Cuff Size: Adult Regular   Pulse: 81   Resp: 16   Temp: 98  F (36.7  C)   TempSrc: Oral   Weight: 127 lb 12.8 oz (58 kg)     Wt Readings from Last 3 Encounters:   19 127 lb 12.8 oz (58 kg)   10/31/17 137 lb (62.1 kg)   17 137 lb 5.6 oz (62.3 kg)       PHYSICAL EXAM:   /80 (Patient Site: Left Arm, Patient Position: Sitting,  Cuff Size: Adult Regular)   Pulse 81   Temp 98  F (36.7  C) (Oral)   Resp 16   Wt 127 lb 12.8 oz (58 kg)   LMP 10/11/2006   BMI 21.94 kg/m     General appearance: alert, appears stated age and cooperative  Head: Normocephalic, without obvious abnormality, atraumatic  Eyes: conjunctivae/corneas clear. PERRL, EOM's intact. Fundi benign.  Ears: normal TM's and external ear canals both ears  Nose: Nares normal. Septum midline. Mucosa normal. No drainage or sinus tenderness.  Throat: lips, mucosa, and tongue normal; teeth and gums normal  Neck: no adenopathy, no carotid bruit, no JVD, supple, symmetrical, trachea midline and thyroid not enlarged, symmetric, no tenderness/mass/nodules  Lungs: clear to auscultation bilaterally  Heart: regular rate and rhythm, S1, S2 normal, no murmur, click, rub or gallop    DATA REVIEWED:  Additional History from Old Records Summarized (2): Previous physical 2 years ago  Decision to Obtain Records (1): 0  Radiology Tests Summarized or Ordered (1): 0  Labs Reviewed or Ordered (1): 1  Medicine Test Summarized or Ordered (1): 0  Independent Review of EKG or X-RAY(2 each): 0    The visit lasted a total of 25 minutes face to face with the patient. Over 50% of the time was spent counseling and educating the patient about plan of care.    MEDICATIONS:  Current Outpatient Medications   Medication Sig Dispense Refill     albuterol (VENTOLIN HFA) 90 mcg/actuation inhaler Inhale 2 puffs every 6 (six) hours as needed. 1 Inhaler 3     biotin 1 mg tablet Take 1,000 mcg by mouth 3 (three) times a day.       CALCIUM CARBONATE (CALCIUM 500 ORAL) Take 500 mg by mouth daily.        CHOLECALCIFEROL, VITAMIN D3, (D3-2000 ORAL) Take 1 capsule by mouth daily.        lansoprazole (PREVACID) 30 MG capsule Take 1 capsule (30 mg total) by mouth daily. 90 capsule 1     LUTEIN ORAL Take 1 tablet by mouth daily.        montelukast (SINGULAIR) 10 mg tablet Take 1 tablet (10 mg total) by mouth daily. Needs office  visit before more refills 90 tablet 1     desloratadine (CLARINEX) 5 mg tablet Take 1 tablet (5 mg total) by mouth daily. 30 tablet 2     No current facility-administered medications for this visit.        This note has been dictated using voice recognition software. Any grammatical or context distortions are unintentional and inherent to the software

## 2021-06-24 ENCOUNTER — RECORDS - HEALTHEAST (OUTPATIENT)
Dept: FAMILY MEDICINE | Facility: CLINIC | Age: 71
End: 2021-06-24

## 2021-06-24 ENCOUNTER — AMBULATORY - HEALTHEAST (OUTPATIENT)
Dept: FAMILY MEDICINE | Facility: CLINIC | Age: 71
End: 2021-06-24

## 2021-06-24 DIAGNOSIS — N89.8 VAGINAL DRYNESS: ICD-10-CM

## 2021-06-24 RX ORDER — ESTRADIOL 0.1 MG/G
1 CREAM VAGINAL DAILY
Qty: 42.5 G | Refills: 2 | Status: SHIPPED | OUTPATIENT
Start: 2021-06-24

## 2021-06-26 NOTE — TELEPHONE ENCOUNTER
Fax from [facility] stating patient does not have insurance. Premarin vaginal cream is $330.00, requesting to switch to different medication.    Please advise

## 2021-06-26 NOTE — TELEPHONE ENCOUNTER
Patient is calling to see if Dr. Kemp can refill her prescriptions?   She has an appointment scheduled on Wednesday for an annual wellness visit.        Patient would like to resume care with Dr. Kemp.   Patient is completely out of medication.

## 2021-06-26 NOTE — TELEPHONE ENCOUNTER
RN cannot approve Refill Request    RN can NOT refill this medication PCP messaged that patient is overdue for Office Visit. Last office visit: Visit date not found Last Physical: 6/12/2020 Last MTM visit: Visit date not found Last visit same specialty: 4/25/2019 Nehemiah Akers MD.  Next visit within 3 mo: Visit date not found  Next physical within 3 mo: Visit date not found      Purvi Chambers, [facility] Triage/Med Refill 6/17/2021    Requested Prescriptions   Pending Prescriptions Disp Refills     buPROPion (WELLBUTRIN XL) 300 MG 24 hr tablet [Pharmacy Med Name: BUPROPION HCL  MG TABLET] 90 tablet 3     Sig: TAKE 1 TABLET BY MOUTH EVERY DAY IN THE MORNING       Tricyclics/Misc Antidepressant/Antianxiety Meds Refill Protocol Failed - 6/17/2021  7:13 PM        Failed - PCP or prescribing provider visit in last year     Last office visit with prescriber/PCP: Visit date not found OR same dept: Visit date not found OR same specialty: 4/25/2019 Nehemiah Akers MD  Last physical: 6/12/2020 Last MTM visit: Visit date not found   Next visit within 3 mo: Visit date not found  Next physical within 3 mo: Visit date not found  Prescriber OR PCP: Toni Kemp MD  Last diagnosis associated with med order: 1. Decreased Concentrating Ability  - buPROPion (WELLBUTRIN XL) 300 MG 24 hr tablet [Pharmacy Med Name: BUPROPION HCL  MG TABLET]; TAKE 1 TABLET BY MOUTH EVERY DAY IN THE MORNING  Dispense: 90 tablet; Refill: 3    2. Mild intermittent asthma without complication  - montelukast (SINGULAIR) 10 mg tablet [Pharmacy Med Name: MONTELUKAST SOD 10 MG TABLET]; Take 1 tablet (10 mg total) by mouth daily. Needs office visit before more refills  Dispense: 90 tablet; Refill: 3    3. Pure hypercholesterolemia  - rosuvastatin (CRESTOR) 5 MG tablet [Pharmacy Med Name: ROSUVASTATIN CALCIUM 5 MG TAB]; TAKE 1 TABLET BY MOUTH EVERYDAY AT BEDTIME  Dispense: 90 tablet; Refill: 3    4. Mosley's esophagus with  dysplasia  - lansoprazole (PREVACID) 30 MG capsule [Pharmacy Med Name: LANSOPRAZOLE DR 30 MG CAPSULE]; TAKE 1 CAPSULE BY MOUTH EVERY DAY  Dispense: 90 capsule; Refill: 3    If protocol passes may refill for 12 months if within 3 months of last provider visit (or a total of 15 months).                montelukast (SINGULAIR) 10 mg tablet [Pharmacy Med Name: MONTELUKAST SOD 10 MG TABLET] 90 tablet 3     Sig: TAKE 1 TABLET (10 MG TOTAL) BY MOUTH DAILY. NEEDS OFFICE VISIT BEFORE MORE REFILLS       Asthma Medications Refill Protocol Failed - 6/17/2021  7:13 PM        Failed - PCP or prescribing provider visit in last year     Last office visit with prescriber/PCP: Visit date not found OR same dept: Visit date not found OR same specialty: 4/25/2019 Nehemiah Akers MD  Last physical: 6/12/2020 Last MTM visit: Visit date not found    Next appt within 3 mo: Visit date not found Next physical within 3 mo: Visit date not found  Prescriber OR PCP: Toni Kemp MD  Last diagnosis associated with med order: 1. Decreased Concentrating Ability  - buPROPion (WELLBUTRIN XL) 300 MG 24 hr tablet [Pharmacy Med Name: BUPROPION HCL  MG TABLET]; TAKE 1 TABLET BY MOUTH EVERY DAY IN THE MORNING  Dispense: 90 tablet; Refill: 3    2. Mild intermittent asthma without complication  - montelukast (SINGULAIR) 10 mg tablet [Pharmacy Med Name: MONTELUKAST SOD 10 MG TABLET]; Take 1 tablet (10 mg total) by mouth daily. Needs office visit before more refills  Dispense: 90 tablet; Refill: 3    3. Pure hypercholesterolemia  - rosuvastatin (CRESTOR) 5 MG tablet [Pharmacy Med Name: ROSUVASTATIN CALCIUM 5 MG TAB]; TAKE 1 TABLET BY MOUTH EVERYDAY AT BEDTIME  Dispense: 90 tablet; Refill: 3    4. Mosley's esophagus with dysplasia  - lansoprazole (PREVACID) 30 MG capsule [Pharmacy Med Name: LANSOPRAZOLE DR 30 MG CAPSULE]; TAKE 1 CAPSULE BY MOUTH EVERY DAY  Dispense: 90 capsule; Refill: 3    If protocol passes may refill for 6 months if within 3  months of last provider visit (or a total of 9 months).             rosuvastatin (CRESTOR) 5 MG tablet [Pharmacy Med Name: ROSUVASTATIN CALCIUM 5 MG TAB] 90 tablet 3     Sig: TAKE 1 TABLET BY MOUTH EVERYDAY AT BEDTIME       Statins Refill Protocol (Hmg CoA Reductase Inhibitors) Failed - 6/17/2021  7:13 PM        Failed - PCP or prescribing provider visit in past 12 months      Last office visit with prescriber/PCP: Visit date not found OR same dept: Visit date not found OR same specialty: 4/25/2019 Nehemiah Akers MD  Last physical: 6/12/2020 Last MTM visit: Visit date not found   Next visit within 3 mo: Visit date not found  Next physical within 3 mo: Visit date not found  Prescriber OR PCP: Toni Kemp MD  Last diagnosis associated with med order: 1. Decreased Concentrating Ability  - buPROPion (WELLBUTRIN XL) 300 MG 24 hr tablet [Pharmacy Med Name: BUPROPION HCL  MG TABLET]; TAKE 1 TABLET BY MOUTH EVERY DAY IN THE MORNING  Dispense: 90 tablet; Refill: 3    2. Mild intermittent asthma without complication  - montelukast (SINGULAIR) 10 mg tablet [Pharmacy Med Name: MONTELUKAST SOD 10 MG TABLET]; Take 1 tablet (10 mg total) by mouth daily. Needs office visit before more refills  Dispense: 90 tablet; Refill: 3    3. Pure hypercholesterolemia  - rosuvastatin (CRESTOR) 5 MG tablet [Pharmacy Med Name: ROSUVASTATIN CALCIUM 5 MG TAB]; TAKE 1 TABLET BY MOUTH EVERYDAY AT BEDTIME  Dispense: 90 tablet; Refill: 3    4. Mosley's esophagus with dysplasia  - lansoprazole (PREVACID) 30 MG capsule [Pharmacy Med Name: LANSOPRAZOLE DR 30 MG CAPSULE]; TAKE 1 CAPSULE BY MOUTH EVERY DAY  Dispense: 90 capsule; Refill: 3    If protocol passes may refill for 12 months if within 3 months of last provider visit (or a total of 15 months).                lansoprazole (PREVACID) 30 MG capsule [Pharmacy Med Name: LANSOPRAZOLE DR 30 MG CAPSULE] 90 capsule 3     Sig: TAKE 1 CAPSULE BY MOUTH EVERY DAY       GI Medications Refill  Protocol Failed - 6/17/2021  7:13 PM        Failed - PCP or prescribing provider visit in last 12 or next 3 months.     Last office visit with prescriber/PCP: Visit date not found OR same dept: Visit date not found OR same specialty: 4/25/2019 Nehemiah Akers MD  Last physical: 6/12/2020 Last MTM visit: Visit date not found   Next visit within 3 mo: Visit date not found  Next physical within 3 mo: Visit date not found  Prescriber OR PCP: Toni Kemp MD  Last diagnosis associated with med order: 1. Decreased Concentrating Ability  - buPROPion (WELLBUTRIN XL) 300 MG 24 hr tablet [Pharmacy Med Name: BUPROPION HCL  MG TABLET]; TAKE 1 TABLET BY MOUTH EVERY DAY IN THE MORNING  Dispense: 90 tablet; Refill: 3    2. Mild intermittent asthma without complication  - montelukast (SINGULAIR) 10 mg tablet [Pharmacy Med Name: MONTELUKAST SOD 10 MG TABLET]; Take 1 tablet (10 mg total) by mouth daily. Needs office visit before more refills  Dispense: 90 tablet; Refill: 3    3. Pure hypercholesterolemia  - rosuvastatin (CRESTOR) 5 MG tablet [Pharmacy Med Name: ROSUVASTATIN CALCIUM 5 MG TAB]; TAKE 1 TABLET BY MOUTH EVERYDAY AT BEDTIME  Dispense: 90 tablet; Refill: 3    4. Mosley's esophagus with dysplasia  - lansoprazole (PREVACID) 30 MG capsule [Pharmacy Med Name: LANSOPRAZOLE DR 30 MG CAPSULE]; TAKE 1 CAPSULE BY MOUTH EVERY DAY  Dispense: 90 capsule; Refill: 3    If protocol passes may refill for 12 months if within 3 months of last provider visit (or a total of 15 months).

## 2021-07-03 NOTE — ADDENDUM NOTE
Addendum Note by Vaishnavi Akers MD at 4/25/2019  7:30 AM     Author: Vaishnavi Akers MD Service: -- Author Type: Physician    Filed: 4/25/2019 10:17 AM Encounter Date: 4/25/2019 Status: Signed    : Vaishnavi Akers MD (Physician)    Addended by: VAISHNAVI AKERS on: 4/25/2019 10:17 AM        Modules accepted: Orders

## 2021-07-03 NOTE — ADDENDUM NOTE
Addendum Note by Aixa Tomlinson CMA at 4/25/2019  7:30 AM     Author: Aixa Tomlinson CMA Service: -- Author Type: Certified Medical Assistant    Filed: 4/25/2019  9:42 AM Encounter Date: 4/25/2019 Status: Signed    : Aixa Tomlinson CMA (Certified Medical Assistant)    Addended by: AIXA TOMLINSON on: 4/25/2019 09:42 AM        Modules accepted: Orders

## 2021-07-07 ENCOUNTER — COMMUNICATION - HEALTHEAST (OUTPATIENT)
Dept: VASCULAR SURGERY | Facility: CLINIC | Age: 71
End: 2021-07-07

## 2021-07-21 ENCOUNTER — RECORDS - HEALTHEAST (OUTPATIENT)
Dept: ADMINISTRATIVE | Facility: CLINIC | Age: 71
End: 2021-07-21

## 2021-07-22 NOTE — LETTER
Letter by Toni Kemp MD at      Author: Toni Kemp MD Service: -- Author Type: --    Filed:  Encounter Date: 7/7/2021 Status: (Other)         07/07/21      Judith FRANK Moreno  [address] [address] [facility] [address]    Dear Judith,    As a valued [facility] patient, your healthcare needs are our priority. Our clinic records indicate we have attempted to contact you to schedule a vein consultation, but we have not heard back from you. If you wish to schedule your appointment please contact our office at your convenience. If you have already made an appointment, please disregard this letter. We can be reached at: [phone]    Sincerely,    [facility]

## 2021-07-24 ENCOUNTER — HEALTH MAINTENANCE LETTER (OUTPATIENT)
Age: 71
End: 2021-07-24

## 2021-09-18 ENCOUNTER — HEALTH MAINTENANCE LETTER (OUTPATIENT)
Age: 71
End: 2021-09-18

## 2021-09-20 ENCOUNTER — TELEPHONE (OUTPATIENT)
Dept: FAMILY MEDICINE | Facility: CLINIC | Age: 71
End: 2021-09-20

## 2021-09-20 DIAGNOSIS — R41.840 ATTENTION OR CONCENTRATION DEFICIT: ICD-10-CM

## 2021-09-20 DIAGNOSIS — J45.20 MILD INTERMITTENT ASTHMA WITHOUT COMPLICATION: ICD-10-CM

## 2021-09-20 DIAGNOSIS — E78.5 HYPERLIPIDEMIA, UNSPECIFIED HYPERLIPIDEMIA TYPE: ICD-10-CM

## 2021-09-20 DIAGNOSIS — K22.719 BARRETT'S ESOPHAGUS WITH DYSPLASIA: ICD-10-CM

## 2021-09-20 RX ORDER — LANSOPRAZOLE 30 MG/1
30 CAPSULE, DELAYED RELEASE ORAL DAILY
Qty: 90 CAPSULE | Refills: 0 | Status: SHIPPED | OUTPATIENT
Start: 2021-09-20 | End: 2021-12-20

## 2021-09-20 RX ORDER — BUPROPION HYDROCHLORIDE 300 MG/1
300 TABLET ORAL EVERY MORNING
Qty: 90 TABLET | Refills: 0 | Status: SHIPPED | OUTPATIENT
Start: 2021-09-20 | End: 2021-12-21

## 2021-09-20 RX ORDER — ATORVASTATIN CALCIUM 10 MG/1
10 TABLET, FILM COATED ORAL DAILY
Qty: 90 TABLET | Refills: 0 | Status: SHIPPED | OUTPATIENT
Start: 2021-09-20 | End: 2021-12-21

## 2021-09-20 RX ORDER — MONTELUKAST SODIUM 10 MG/1
10 TABLET ORAL DAILY
Qty: 90 TABLET | Refills: 0 | Status: SHIPPED | OUTPATIENT
Start: 2021-09-20 | End: 2021-12-21

## 2021-09-20 NOTE — TELEPHONE ENCOUNTER
Patient is scheduled for lab only appt on 9/29/2021 to get her cholesterol checked. Would you be willing to place the lab orders ?    Please advise.

## 2021-09-20 NOTE — TELEPHONE ENCOUNTER
Reason for Call:  Other prescription    Detailed comments: Katie Chambers called today.  States that they have been trying to get in touch with Dr. Toni Bautista at [facility].  Clinic told them that this patient has never seen this provider.  [facility] states that this patient and provider had an appointment this year.  No sure why they said this?  Patient needs medications:  1)  Monoglucast  2)  Lansoprazole  3)  Bupropinoe  4)  Rosuvastatin    Please contact [facility] back today.  Thank you.    Phone Number Patient can be reached at: Other phone number:  [phone]*    Best Time: any    Can we leave a detailed message on this number? YES    Call taken on 9/20/2021 at 10:18 AM by Kate Gomez

## 2021-11-13 ENCOUNTER — HEALTH MAINTENANCE LETTER (OUTPATIENT)
Age: 71
End: 2021-11-13

## 2021-12-18 DIAGNOSIS — E78.5 HYPERLIPIDEMIA, UNSPECIFIED HYPERLIPIDEMIA TYPE: ICD-10-CM

## 2021-12-18 DIAGNOSIS — R41.840 ATTENTION OR CONCENTRATION DEFICIT: ICD-10-CM

## 2021-12-18 DIAGNOSIS — J45.20 MILD INTERMITTENT ASTHMA WITHOUT COMPLICATION: ICD-10-CM

## 2021-12-18 DIAGNOSIS — K22.719 BARRETT'S ESOPHAGUS WITH DYSPLASIA: ICD-10-CM

## 2021-12-20 NOTE — TELEPHONE ENCOUNTER
"Routing refill request to provider for review/approval because:  Labs not current:  For atorvastatin. No lipid panel on file in the last year.  No visit in the last 6 months.    Last Written Prescription Date:  09/20/2021-atorvastatin  Last Fill Quantity: 90,  # refills: 0   Last office visit provider:  06/23/2021 with Dr Kemp.    Last Written Prescription Date:  09/20/2021-Wellbutrin  Last Fill Quantity: 90,  # refills: 0   Last office visit provider:  06/23/2021 with Dr Kemp.    Last Written Prescription Date:  09/20/2021-montelukast  Last Fill Quantity: 90,  # refills: 0   Last office visit provider: 06/23/2021 with Dr Kemp.    Last written Prescription Date: 09/20/2021-lansoprazole  Last Fill Quantity: 90,  # refills: 0   Last office visit provider:  06/23/2021 with Dr Kemp.        Requested Prescriptions   Pending Prescriptions Disp Refills     LANsoprazole (PREVACID) 30 MG DR capsule [Pharmacy Med Name: LANSOPRAZOLE 30MG DR CAPSULES] 90 capsule 0     Sig: TAKE 1 CAPSULE(30 MG) BY MOUTH DAILY       PPI Protocol Passed - 12/18/2021  3:28 AM        Passed - Not on Clopidogrel (unless Pantoprazole ordered)        Passed - No diagnosis of osteoporosis on record        Passed - Recent (12 mo) or future (30 days) visit within the authorizing provider's specialty     Patient has had an office visit with the authorizing provider or a provider within the authorizing providers department within the previous 12 mos or has a future within next 30 days. See \"Patient Info\" tab in inbasket, or \"Choose Columns\" in Meds & Orders section of the refill encounter.              Passed - Medication is active on med list        Passed - Patient is age 18 or older        Passed - No active pregnacy on record        Passed - No positive pregnancy test in past 12 months           atorvastatin (LIPITOR) 10 MG tablet [Pharmacy Med Name: ATORVASTATIN 10MG TABLETS] 90 tablet 0     Sig: TAKE 1 TABLET(10 MG) BY MOUTH DAILY       Statins " "Protocol Failed - 12/18/2021  3:28 AM        Failed - LDL on file in past 12 months     Recent Labs   Lab Test 03/26/19  0817   *             Passed - No abnormal creatine kinase in past 12 months     No lab results found.             Passed - Recent (12 mo) or future (30 days) visit within the authorizing provider's specialty     Patient has had an office visit with the authorizing provider or a provider within the authorizing providers department within the previous 12 mos or has a future within next 30 days. See \"Patient Info\" tab in inbasket, or \"Choose Columns\" in Meds & Orders section of the refill encounter.              Passed - Medication is active on med list        Passed - Patient is age 18 or older        Passed - No active pregnancy on record        Passed - No positive pregnancy test in past 12 months           buPROPion (WELLBUTRIN XL) 300 MG 24 hr tablet [Pharmacy Med Name: BUPROPION XL 300MG TABLETS] 90 tablet 0     Sig: TAKE 1 TABLET(300 MG) BY MOUTH EVERY MORNING       SSRIs Protocol Passed - 12/18/2021  3:28 AM        Passed - Recent (12 mo) or future (30 days) visit within the authorizing provider's specialty     Patient has had an office visit with the authorizing provider or a provider within the authorizing providers department within the previous 12 mos or has a future within next 30 days. See \"Patient Info\" tab in inLemonQuestsket, or \"Choose Columns\" in Meds & Orders section of the refill encounter.              Passed - Medication is Bupropion     If the medication is Bupropion (Wellbutrin), and the patient is taking for smoking cessation; OK to refill.          Passed - Medication is active on med list        Passed - Patient is age 18 or older        Passed - No active pregnancy on record        Passed - No positive pregnancy test in last 12 months           montelukast (SINGULAIR) 10 MG tablet [Pharmacy Med Name: MONTELUKAST 10MG TABLETS] 90 tablet 0     Sig: TAKE 1 TABLET(10 MG) BY " "MOUTH DAILY       Leukotriene Inhibitors Protocol Failed - 12/18/2021  3:28 AM        Failed - Asthma control assessment score within normal limits in last 6 months     Please review ACT score.           Failed - Recent (6 mo) or future (30 days) visit within the authorizing provider's specialty     Patient had office visit in the last 6 months or has a visit in the next 30 days with authorizing provider or within the authorizing provider's specialty.  See \"Patient Info\" tab in inbasket, or \"Choose Columns\" in Meds & Orders section of the refill encounter.            Passed - Patient is age 12 or older     If patient is under 16, ok to refill using age based dosing.           Passed - Medication is active on med list             Meghan Antunez 12/20/21 3:27 PM  "

## 2021-12-21 RX ORDER — LANSOPRAZOLE 30 MG/1
CAPSULE, DELAYED RELEASE ORAL
Qty: 90 CAPSULE | Refills: 1 | Status: SHIPPED | OUTPATIENT
Start: 2021-12-21 | End: 2022-07-04

## 2021-12-21 RX ORDER — ATORVASTATIN CALCIUM 10 MG/1
TABLET, FILM COATED ORAL
Qty: 90 TABLET | Refills: 0 | Status: SHIPPED | OUTPATIENT
Start: 2021-12-21 | End: 2022-03-22

## 2021-12-21 RX ORDER — BUPROPION HYDROCHLORIDE 300 MG/1
TABLET ORAL
Qty: 90 TABLET | Refills: 0 | Status: SHIPPED | OUTPATIENT
Start: 2021-12-21 | End: 2022-03-22

## 2021-12-21 RX ORDER — MONTELUKAST SODIUM 10 MG/1
TABLET ORAL
Qty: 90 TABLET | Refills: 0 | Status: SHIPPED | OUTPATIENT
Start: 2021-12-21 | End: 2022-03-22

## 2022-03-21 DIAGNOSIS — E78.5 HYPERLIPIDEMIA, UNSPECIFIED HYPERLIPIDEMIA TYPE: ICD-10-CM

## 2022-03-21 DIAGNOSIS — R41.840 ATTENTION OR CONCENTRATION DEFICIT: ICD-10-CM

## 2022-03-21 DIAGNOSIS — J45.20 MILD INTERMITTENT ASTHMA WITHOUT COMPLICATION: ICD-10-CM

## 2022-03-22 RX ORDER — MONTELUKAST SODIUM 10 MG/1
TABLET ORAL
Qty: 90 TABLET | Refills: 0 | Status: SHIPPED | OUTPATIENT
Start: 2022-03-22 | End: 2022-07-04

## 2022-03-22 RX ORDER — ATORVASTATIN CALCIUM 10 MG/1
TABLET, FILM COATED ORAL
Qty: 90 TABLET | Refills: 0 | Status: SHIPPED | OUTPATIENT
Start: 2022-03-22

## 2022-03-22 RX ORDER — BUPROPION HYDROCHLORIDE 300 MG/1
TABLET ORAL
Qty: 90 TABLET | Refills: 1 | Status: SHIPPED | OUTPATIENT
Start: 2022-03-22 | End: 2022-10-13

## 2022-03-22 NOTE — TELEPHONE ENCOUNTER
"Routing refill request to provider for review/approval because:  Labs not current:  LDL  ACT score out of date.    Last Written Prescription Date:  12/21/21  Last Fill Quantity: 90,  # refills: 0   Last office visit provider:  6/23/21     Requested Prescriptions   Pending Prescriptions Disp Refills     montelukast (SINGULAIR) 10 MG tablet [Pharmacy Med Name: MONTELUKAST 10MG TABLETS] 90 tablet 0     Sig: TAKE 1 TABLET(10 MG) BY MOUTH DAILY       Leukotriene Inhibitors Protocol Failed - 3/21/2022  3:29 AM        Failed - Asthma control assessment score within normal limits in last 6 months     Please review ACT score.           Failed - Recent (6 mo) or future (30 days) visit within the authorizing provider's specialty     Patient had office visit in the last 6 months or has a visit in the next 30 days with authorizing provider or within the authorizing provider's specialty.  See \"Patient Info\" tab in inbasket, or \"Choose Columns\" in Meds & Orders section of the refill encounter.            Passed - Patient is age 12 or older     If patient is under 16, ok to refill using age based dosing.           Passed - Medication is active on med list           atorvastatin (LIPITOR) 10 MG tablet [Pharmacy Med Name: ATORVASTATIN 10MG TABLETS] 90 tablet 0     Sig: TAKE 1 TABLET(10 MG) BY MOUTH DAILY       Statins Protocol Failed - 3/21/2022  3:29 AM        Failed - LDL on file in past 12 months     Recent Labs   Lab Test 03/26/19  0817   *             Passed - No abnormal creatine kinase in past 12 months     No lab results found.             Passed - Recent (12 mo) or future (30 days) visit within the authorizing provider's specialty     Patient has had an office visit with the authorizing provider or a provider within the authorizing providers department within the previous 12 mos or has a future within next 30 days. See \"Patient Info\" tab in inbasket, or \"Choose Columns\" in Meds & Orders section of the refill " "encounter.              Passed - Medication is active on med list        Passed - Patient is age 18 or older        Passed - No active pregnancy on record        Passed - No positive pregnancy test in past 12 months         Signed Prescriptions Disp Refills    buPROPion (WELLBUTRIN XL) 300 MG 24 hr tablet 90 tablet 1     Sig: TAKE 1 TABLET(300 MG) BY MOUTH EVERY MORNING       SSRIs Protocol Passed - 3/21/2022  3:29 AM        Passed - Recent (12 mo) or future (30 days) visit within the authorizing provider's specialty     Patient has had an office visit with the authorizing provider or a provider within the authorizing providers department within the previous 12 mos or has a future within next 30 days. See \"Patient Info\" tab in inbasket, or \"Choose Columns\" in Meds & Orders section of the refill encounter.              Passed - Medication is Bupropion     If the medication is Bupropion (Wellbutrin), and the patient is taking for smoking cessation; OK to refill.          Passed - Medication is active on med list        Passed - Patient is age 18 or older        Passed - No active pregnancy on record        Passed - No positive pregnancy test in last 12 months             Reyes Carlson RN 03/22/22 9:32 AM  "

## 2022-03-22 NOTE — TELEPHONE ENCOUNTER
"Last Written Prescription Date:  12/21/21  Last Fill Quantity: 90,  # refills: 0   Last office visit provider:  6/23/21     Requested Prescriptions   Pending Prescriptions Disp Refills     buPROPion (WELLBUTRIN XL) 300 MG 24 hr tablet [Pharmacy Med Name: BUPROPION XL 300MG TABLETS] 90 tablet 0     Sig: TAKE 1 TABLET(300 MG) BY MOUTH EVERY MORNING       SSRIs Protocol Passed - 3/21/2022  3:29 AM        Passed - Recent (12 mo) or future (30 days) visit within the authorizing provider's specialty     Patient has had an office visit with the authorizing provider or a provider within the authorizing providers department within the previous 12 mos or has a future within next 30 days. See \"Patient Info\" tab in inbasket, or \"Choose Columns\" in Meds & Orders section of the refill encounter.              Passed - Medication is Bupropion     If the medication is Bupropion (Wellbutrin), and the patient is taking for smoking cessation; OK to refill.          Passed - Medication is active on med list        Passed - Patient is age 18 or older        Passed - No active pregnancy on record        Passed - No positive pregnancy test in last 12 months           montelukast (SINGULAIR) 10 MG tablet [Pharmacy Med Name: MONTELUKAST 10MG TABLETS] 90 tablet 0     Sig: TAKE 1 TABLET(10 MG) BY MOUTH DAILY       Leukotriene Inhibitors Protocol Failed - 3/21/2022  3:29 AM        Failed - Asthma control assessment score within normal limits in last 6 months     Please review ACT score.           Failed - Recent (6 mo) or future (30 days) visit within the authorizing provider's specialty     Patient had office visit in the last 6 months or has a visit in the next 30 days with authorizing provider or within the authorizing provider's specialty.  See \"Patient Info\" tab in inbasket, or \"Choose Columns\" in Meds & Orders section of the refill encounter.            Passed - Patient is age 12 or older     If patient is under 16, ok to refill using age " "based dosing.           Passed - Medication is active on med list           atorvastatin (LIPITOR) 10 MG tablet [Pharmacy Med Name: ATORVASTATIN 10MG TABLETS] 90 tablet 0     Sig: TAKE 1 TABLET(10 MG) BY MOUTH DAILY       Statins Protocol Failed - 3/21/2022  3:29 AM        Failed - LDL on file in past 12 months     Recent Labs   Lab Test 03/26/19  0817   *             Passed - No abnormal creatine kinase in past 12 months     No lab results found.             Passed - Recent (12 mo) or future (30 days) visit within the authorizing provider's specialty     Patient has had an office visit with the authorizing provider or a provider within the authorizing providers department within the previous 12 mos or has a future within next 30 days. See \"Patient Info\" tab in inbasket, or \"Choose Columns\" in Meds & Orders section of the refill encounter.              Passed - Medication is active on med list        Passed - Patient is age 18 or older        Passed - No active pregnancy on record        Passed - No positive pregnancy test in past 12 months             Reyes Carlson RN 03/22/22 9:31 AM  "

## 2022-07-02 DIAGNOSIS — J45.20 MILD INTERMITTENT ASTHMA WITHOUT COMPLICATION: ICD-10-CM

## 2022-07-02 DIAGNOSIS — K22.719 BARRETT'S ESOPHAGUS WITH DYSPLASIA: ICD-10-CM

## 2022-07-02 NOTE — TELEPHONE ENCOUNTER
"Routing refill request to provider for review/approval because:  Patient needs to be seen because it has been more than 1 year since last office visit.    Last Written Prescription Date:  12/21/21  Last Fill Quantity: 90,  # refills: 1   Last office visit provider:  6/23/21     Requested Prescriptions   Pending Prescriptions Disp Refills     LANsoprazole (PREVACID) 30 MG DR capsule [Pharmacy Med Name: LANSOPRAZOLE 30MG DR CAPSULES] 90 capsule 1     Sig: TAKE 1 CAPSULE(30 MG) BY MOUTH DAILY       PPI Protocol Failed - 7/2/2022  3:28 AM        Failed - Recent (12 mo) or future (30 days) visit within the authorizing provider's specialty     Patient has had an office visit with the authorizing provider or a provider within the authorizing providers department within the previous 12 mos or has a future within next 30 days. See \"Patient Info\" tab in inbasket, or \"Choose Columns\" in Meds & Orders section of the refill encounter.              Passed - Not on Clopidogrel (unless Pantoprazole ordered)        Passed - No diagnosis of osteoporosis on record        Passed - Medication is active on med list        Passed - Patient is age 18 or older        Passed - No active pregnacy on record        Passed - No positive pregnancy test in past 12 months           montelukast (SINGULAIR) 10 MG tablet [Pharmacy Med Name: MONTELUKAST 10MG TABLETS] 90 tablet 0     Sig: TAKE 1 TABLET(10 MG) BY MOUTH DAILY       Leukotriene Inhibitors Protocol Failed - 7/2/2022  3:28 AM        Failed - Asthma control assessment score within normal limits in last 6 months     Please review ACT score.           Failed - Recent (6 mo) or future (30 days) visit within the authorizing provider's specialty     Patient had office visit in the last 6 months or has a visit in the next 30 days with authorizing provider or within the authorizing provider's specialty.  See \"Patient Info\" tab in inbasket, or \"Choose Columns\" in Meds & Orders section of the refill " encounter.            Passed - Patient is age 12 or older     If patient is under 16, ok to refill using age based dosing.           Passed - Medication is active on med list             Roxann Hurley RN 07/02/22 4:43 PM

## 2022-07-04 RX ORDER — LANSOPRAZOLE 30 MG/1
CAPSULE, DELAYED RELEASE ORAL
Qty: 90 CAPSULE | Refills: 1 | Status: SHIPPED | OUTPATIENT
Start: 2022-07-04

## 2022-07-04 RX ORDER — MONTELUKAST SODIUM 10 MG/1
TABLET ORAL
Qty: 90 TABLET | Refills: 0 | Status: SHIPPED | OUTPATIENT
Start: 2022-07-04 | End: 2022-10-13

## 2022-08-20 ENCOUNTER — HEALTH MAINTENANCE LETTER (OUTPATIENT)
Age: 72
End: 2022-08-20

## 2022-10-13 ENCOUNTER — MYC REFILL (OUTPATIENT)
Dept: FAMILY MEDICINE | Facility: CLINIC | Age: 72
End: 2022-10-13

## 2022-10-13 DIAGNOSIS — R41.840 ATTENTION OR CONCENTRATION DEFICIT: ICD-10-CM

## 2022-10-13 DIAGNOSIS — R41.840 ATTENTION OR CONCENTRATION DEFICIT: Primary | ICD-10-CM

## 2022-10-13 DIAGNOSIS — J45.20 MILD INTERMITTENT ASTHMA WITHOUT COMPLICATION: ICD-10-CM

## 2022-10-13 RX ORDER — MONTELUKAST SODIUM 10 MG/1
10 TABLET ORAL DAILY
Qty: 90 TABLET | Refills: 0 | Status: SHIPPED | OUTPATIENT
Start: 2022-10-13

## 2022-10-13 RX ORDER — BUPROPION HYDROCHLORIDE 300 MG/1
300 TABLET ORAL EVERY MORNING
Qty: 90 TABLET | Refills: 0 | Status: SHIPPED | OUTPATIENT
Start: 2022-10-13

## 2022-10-13 NOTE — TELEPHONE ENCOUNTER
"Routing refill request to provider for review/approval because:  Patient needs to be seen because it has been more than 1 year since last office visit.  ACT score out of date/not on file.    Last Written Prescription Date:  3/2/22  Last Fill Quantity: 90,  # refills: 1   Last office visit provider:  6/23/21     Last Written Prescription Date:  7/4/22  Last Fill Quantity: 90,  # refills: 0   Last office visit provider:  6/23/21    Requested Prescriptions   Pending Prescriptions Disp Refills     buPROPion (WELLBUTRIN XL) 300 MG 24 hr tablet [Pharmacy Med Name: BUPROPION XL 300MG TABLETS] 90 tablet 1     Sig: TAKE 1 TABLET(300 MG) BY MOUTH EVERY MORNING       SSRIs Protocol Failed - 10/13/2022  3:27 AM        Failed - Recent (12 mo) or future (30 days) visit within the authorizing provider's specialty     Patient has had an office visit with the authorizing provider or a provider within the authorizing providers department within the previous 12 mos or has a future within next 30 days. See \"Patient Info\" tab in inbasket, or \"Choose Columns\" in Meds & Orders section of the refill encounter.              Passed - Medication is Bupropion     If the medication is Bupropion (Wellbutrin), and the patient is taking for smoking cessation; OK to refill.          Passed - Medication is active on med list        Passed - Patient is age 18 or older        Passed - No active pregnancy on record        Passed - No positive pregnancy test in last 12 months           montelukast (SINGULAIR) 10 MG tablet [Pharmacy Med Name: MONTELUKAST 10MG TABLETS] 90 tablet 0     Sig: TAKE 1 TABLET(10 MG) BY MOUTH DAILY       Leukotriene Inhibitors Protocol Failed - 10/13/2022  3:27 AM        Failed - Asthma control assessment score within normal limits in last 6 months     Please review ACT score.           Failed - Recent (6 mo) or future (30 days) visit within the authorizing provider's specialty     Patient had office visit in the last 6 months " "or has a visit in the next 30 days with authorizing provider or within the authorizing provider's specialty.  See \"Patient Info\" tab in inbasket, or \"Choose Columns\" in Meds & Orders section of the refill encounter.            Passed - Patient is age 12 or older     If patient is under 16, ok to refill using age based dosing.           Passed - Medication is active on med list             Reyes Carlson RN 10/13/22 12:43 PM  "

## 2022-11-19 ENCOUNTER — HEALTH MAINTENANCE LETTER (OUTPATIENT)
Age: 72
End: 2022-11-19

## 2023-09-10 ENCOUNTER — HEALTH MAINTENANCE LETTER (OUTPATIENT)
Age: 73
End: 2023-09-10

## 2023-10-29 NOTE — ANESTHESIA CARE TRANSFER NOTE
Last vitals:   Vitals:    03/07/17 1051   BP: 127/82   Pulse: 75   Resp: 16   Temp: 36.5  C (97.7  F)   SpO2: 95%     Patient's level of consciousness is drowsy  Spontaneous respirations: yes  Maintains airway independently: yes  Dentition unchanged: yes  Oropharynx: oropharynx clear of all foreign objects    QCDR Measures:  ASA# 20 - Surgical Safety Checklist: ASA20A - Safety Checks Done  PQRS# 430 - Adult PONV Prevention: 4558F - Pt received => 2 anti-emetic agents (different classes) preop & intraop  ASA# 8 - Peds PONV Prevention: NA - Not pediatric patient, not GA or 2 or more risk factors NOT present  PQRS# 424 - [place]ka-op Temp Management: 4559F - At least one body temp DOCUMENTED => 35.5C or 95.9F within required timeframe  PQRS# 426 - PACU Transfer Protocol: - Transfer of care checklist used  ASA# 14 - Acute Post-op Pain: ASA14B - Patient did NOT experience pain >= 7 out of 10    I completed my SBAR handoff to the receiving nurse per policy and procedure.   eye drops

## 2025-03-21 PROBLEM — K22.70 BARRETT'S ESOPHAGUS: Status: ACTIVE | Noted: 2025-03-21

## 2025-03-21 PROBLEM — E78.5 HYPERLIPIDEMIA: Status: RESOLVED | Noted: 2017-03-24 | Resolved: 2025-03-21

## 2025-03-24 ENCOUNTER — PATIENT OUTREACH (OUTPATIENT)
Dept: CARE COORDINATION | Facility: CLINIC | Age: 75
End: 2025-03-24
Payer: COMMERCIAL

## 2025-03-26 ENCOUNTER — PATIENT OUTREACH (OUTPATIENT)
Dept: CARE COORDINATION | Facility: CLINIC | Age: 75
End: 2025-03-26
Payer: COMMERCIAL

## 2025-03-31 ENCOUNTER — OFFICE VISIT (OUTPATIENT)
Dept: ORTHOPEDICS | Facility: CLINIC | Age: 75
End: 2025-03-31
Attending: PHYSICIAN ASSISTANT
Payer: COMMERCIAL

## 2025-03-31 ENCOUNTER — HOSPITAL ENCOUNTER (OUTPATIENT)
Facility: AMBULATORY SURGERY CENTER | Age: 75
End: 2025-03-31
Attending: ORTHOPAEDIC SURGERY | Admitting: ORTHOPAEDIC SURGERY
Payer: COMMERCIAL

## 2025-03-31 VITALS — RESPIRATION RATE: 20 BRPM | BODY MASS INDEX: 23.57 KG/M2 | HEIGHT: 63 IN | WEIGHT: 133 LBS

## 2025-03-31 DIAGNOSIS — G56.01 CARPAL TUNNEL SYNDROME OF RIGHT WRIST: Primary | ICD-10-CM

## 2025-03-31 DIAGNOSIS — M67.431 GANGLION CYST OF WRIST, RIGHT: ICD-10-CM

## 2025-03-31 PROCEDURE — 99203 OFFICE O/P NEW LOW 30 MIN: CPT | Performed by: ORTHOPAEDIC SURGERY

## 2025-03-31 NOTE — PATIENT INSTRUCTIONS
Surgery:  right carpal tunnel release.    Preop physical with primary physician is needed within 30 days of the surgery.  Nothing to eat or drink for 8 hours before surgery.  For same day surgery you need a ride.  Someone should stay with you for 12 hours afterward.  Stop blood thinners 5 days before surgery (aspirin, warfarin, anti-inflammatories).      Surgery schedulers will call you to schedule surgery.    If you don't get a call in the next few days, then call [phone] to schedule your surgery for [place] or [phone] to schedule for [place].

## 2025-03-31 NOTE — PROGRESS NOTES
Judith Moreno is a 74 year old female who is seen in consultation at the request of Brunilda Martínez for complaint of numbness of right thumb with lump at base of thumb and wrist.   She noted the lump for the past 3 weeks.  She does a lot of sewing, and has done more lately for childrens' quilts.  Pain is sharp and is achey and throbbing.    She is having trouble holding things due to thumb weakness.  Small finger is not involved.  Prior treatment used: Wrist splint - no.  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      PAST MEDICAL HISTORY:  Diabetes mellitus negative, hypothyroid negative.    EMG has not been performed.      History reviewed. No pertinent past medical history.    Past Surgical History:   Procedure Laterality Date    ARTHROSCOPY ANKLE Right 3/7/2017    Procedure: STAGE II:  RIGHT ANKLE ARTHROSCOPY ;  Surgeon: Binh Arrington MD;  Location: [facility];  Service:     EXCISE GANGLION WRIST Bilateral     right hand x1, left x2    MI HALLUX RIGIDUS W/CHEILECTOMY 1ST MP JT W/O IMPLT Right 3/7/2017    Procedure: CHEILECTOMY WITH POSSIBLE SOFT TISSUE ARTHROPLASTY;  Surgeon: Binh Arrington MD;  Location: [facility];  Service: Orthopedics    RECONSTRUCT ANKLE Right 3/7/2017    Procedure: LATERAL ANKLE LIGAMENT RECONSTRUCTION WITH INTERNAL BRACE;  Surgeon: Binh Arrington MD;  Location: [facility];  Service:     [facility] APPENDECTOMY      Description: Appendectomy;  Recorded: 2014;  Comments: ? with     [facility]  DELIVERY ONLY      Description:  Section Classical;  Recorded: 2014;  Comments: x 2 and tubal ligation    [facility] FUSION FOOT BONES,TRIPLE Right 2017    Procedure: STAGE 1: RIGHT NAVICULOCUNEIFORM ARTHRODESIS WITH CALCANEAL BONE GRAFT;  Surgeon: Binh Arrington MD;  Location: [facility];  Service: Orthopedics       Family History   Problem Relation Age of Onset    Breast Cancer  Mother 80         lived to 92    Dementia Mother     Cancer Father         Pancreatic- not bio dad       Social History     Socioeconomic History    Marital status: Single     Spouse name: Not on file    Number of children: Not on file    Years of education: Not on file    Highest education level: Not on file   Occupational History    Not on file   Tobacco Use    Smoking status: Former     Current packs/day: 0.00     Average packs/day: 0.5 packs/day for 20.0 years (10.0 ttl pk-yrs)     Types: Cigarettes     Start date: 1966     Quit date: 1986     Years since quittin.2    Smokeless tobacco: Never   Substance and Sexual Activity    Alcohol use: Yes     Comment: Alcoholic Drinks/day: rarely    Drug use: No    Sexual activity: Not on file   Other Topics Concern    Not on file   Social History Narrative    Lives with Male partner. 2 cats and 2 dogs. Has 2 adult children and one adopted and  grand children.  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    Nehemiah Akers MD  3/26/2019    Score cannot be calculated as LDL is very high  The 10-y ear ASCVD risk score (Tupmankeo METZ Jr., et al., 2013) is: 6%  LDL> 200    Values used to calculate the score:      Age: 68 years      Sex: Female      Is Non- : No      Diabetic: No      Tobacco smoker: No      Systolic Blood Pressure:  120 mmHg      Is BP treated: No      HDL Cholesterol: 72 mg/dL LDL> 200      Total Cholesterol: 294 mg/dL       Social Drivers of Health     Financial Resource Strain: Low Risk  (3/21/2025)    Financial Resource Strain     Within the past 12 months, have you or your family members you live with been unable to get utilities (heat, electricity) when it was really needed?: No   Food Insecurity: High Risk (3/21/2025)    Food Insecurity     Within the past 12 months, did you worry that your food would run out before you got money to buy more?: Yes     Within the past 12 months, did the food you bought just  not last and you didn t have money to get more?: Yes   Transportation Needs: Low Risk  (3/21/2025)    Transportation Needs     Within the past 12 months, has lack of transportation kept you from medical appointments, getting your medicines, non-medical meetings or appointments, work, or from getting things that you need?: No   Physical Activity: Unknown (3/21/2025)    Exercise Vital Sign     Days of Exercise per Week: 7 days     Minutes of Exercise per Session: Not on file   Stress: Stress Concern Present (3/21/2025)    [employer] [place] [employer] - Occupational Stress Questionnaire     Feeling of Stress : To some extent   Social Connections: Unknown (3/21/2025)    Social Connection and Isolation Panel [NHANES]     Frequency of Communication with Friends and Family: Not on file     Frequency of Social Gatherings with Friends and Family: More than three times a week     Attends Congregational Services: Not on file     Active Member of Clubs or Organizations: Not on file     Attends Club or Organization Meetings: Not on file     Marital Status: Not on file   Interpersonal Safety: Not on file   Housing Stability: Low Risk  (3/21/2025)    Housing Stability     Do you have housing? : Yes     Are you worried about losing your housing?: No       Current Outpatient Medications   Medication Sig Dispense Refill    albuterol (PROAIR HFA/PROVENTIL HFA/VENTOLIN HFA) 108 (90 Base) MCG/ACT inhaler Inhale 2 puffs into the lungs every 6 hours as needed for shortness of breath. 18 g 3    atorvastatin (LIPITOR) 40 MG tablet Take 1 tablet (40 mg) by mouth daily. 90 tablet 3    LANsoprazole (PREVACID) 30 MG DR capsule Take 1 capsule (30 mg) by mouth daily. 90 capsule 3       Allergies   Allergen Reactions    Hydrocodone-Acetaminophen Nausea and Vomiting       REVIEW OF SYSTEMS:  CONSTITUTIONAL:  NEGATIVE for fever, chills, change in weight, not feeling tired  SKIN:  NEGATIVE for worrisome rashes, no skin lumps, no skin ulcers  "and no non-healing wounds  EYES:  NEGATIVE for vision changes or irritation.  ENT/MOUTH:  NEGATIVE.  No hearing loss, no hoarseness, no difficulty swallowing.  RESP:  NEGATIVE. No cough or shortness of breath.  BREAST:  NEGATIVE for masses, tenderness or discharge  CV:  NEGATIVE for chest pain, palpitations or peripheral edema  GI:  NEGATIVE for nausea, abdominal pain, heartburn, or change in bowel habits  :  Negative. No dysuria, no hematuria  MUSCULOSKELETAL:  See HPI above  NEURO:  NEGATIVE . No headaches, no dizziness,  no numbness  ENDOCRINE:  NEGATIVE for temperature intolerance, skin/hair changes  HEME/ALLERGY/IMMUNE:  NEGATIVE for bleeding problems  PSYCHIATRIC:  NEGATIVE. no anxiety, no depression.          O: She appears well,   Vitals: Resp 20   Ht 1.607 m (5' 3.25\")   Wt 60.3 kg (133 lb)   BMI 23.37 kg/m    BMI= Body mass index is 23.37 kg/m .   Cervical spine has full range of motion without pain.  Full range of motion of shoulder, elbows, wrists and fingers.  Hand exam revealed     Right: reduced sensation along right thumb, + Phalen's maneuver, + Tinel's sign, + thenar atrophy, + weakness of thumb/pinky pincer grasp.  The thenar atrophy makes it look like the normal bone has lumps.  This is what she was seeing.  Left: normal sensation of hand and arm, negative Phalen's maneuver, negative Tinel's sign, no thenar atrophy, no weakness of thumb/pinky pincer grasp   strength: Right decreased, Left normal.    A: Right carpal tunnel syndrome - severe with thenar atrophy and weakness..      P: Explanation of median nerve entrapment is given.  The treatment spectrum is discussed, including conservative treatment with night splint, NSAID, activity modification, and possible surgical release.  Given the severity of the thenar weakness and wasting, I recommend carpal tunnel release now.  We will want to make sure that thenar branch is free.  Will proceed with right carpal tunnel release with IV regional " block.  Risks, benefits, potential complications and alternatives were discussed.

## 2025-03-31 NOTE — LETTER
3/31/2025      Judith Moreno  [address]  [facility] [address]      Dear Colleague,    Thank you for referring your patient, Judith Moreno, to the [facility]. Please see a copy of my visit note below.    Judith Moreno is a 74 year old female who is seen in consultation at the request of Brunilda Martínez for complaint of numbness of right thumb with lump at base of thumb and wrist.   She noted the lump for the past 3 weeks.  She does a lot of sewing, and has done more lately for childrens' quilts.  Pain is sharp and is achey and throbbing.    She is having trouble holding things due to thumb weakness.  Small finger is not involved.  Prior treatment used: Wrist splint - no.  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      PAST MEDICAL HISTORY:  Diabetes mellitus negative, hypothyroid negative.    EMG has not been performed.      History reviewed. No pertinent past medical history.    Past Surgical History:   Procedure Laterality Date     ARTHROSCOPY ANKLE Right 3/7/2017    Procedure: STAGE II:  RIGHT ANKLE ARTHROSCOPY ;  Surgeon: Binh Arrington MD;  Location: [facility];  Service:      EXCISE GANGLION WRIST Bilateral     right hand x1, left x2     CA HALLUX RIGIDUS W/CHEILECTOMY 1ST MP JT W/O IMPLT Right 3/7/2017    Procedure: CHEILECTOMY WITH POSSIBLE SOFT TISSUE ARTHROPLASTY;  Surgeon: Binh Arrington MD;  Location: [facility];  Service: Orthopedics     RECONSTRUCT ANKLE Right 3/7/2017    Procedure: LATERAL ANKLE LIGAMENT RECONSTRUCTION WITH INTERNAL BRACE;  Surgeon: Binh Arrington MD;  Location: [facility];  Service:      [facility] APPENDECTOMY      Description: Appendectomy;  Recorded: 2014;  Comments: ? with      [facility]C  DELIVERY ONLY      Description:  Section Classical;  Recorded: 2014;  Comments: x 2 and tubal ligation     ZZC FUSION FOOT BONES,TRIPLE Right 2017     Procedure: STAGE 1: RIGHT NAVICULOCUNEIFORM ARTHRODESIS WITH CALCANEAL BONE GRAFT;  Surgeon: Binh Arrington MD;  Location: [facility];  Service: Orthopedics       Family History   Problem Relation Age of Onset     Breast Cancer Mother 80         lived to 92     Dementia Mother      Cancer Father         Pancreatic- not bio dad       Social History     Socioeconomic History     Marital status: Single     Spouse name: Not on file     Number of children: Not on file     Years of education: Not on file     Highest education level: Not on file   Occupational History     Not on file   Tobacco Use     Smoking status: Former     Current packs/day: 0.00     Average packs/day: 0.5 packs/day for 20.0 years (10.0 ttl pk-yrs)     Types: Cigarettes     Start date: 1966     Quit date: 1986     Years since quittin.2     Smokeless tobacco: Never   Substance and Sexual Activity     Alcohol use: Yes     Comment: Alcoholic Drinks/day: rarely     Drug use: No     Sexual activity: Not on file   Other Topics Concern     Not on file   Social History Narrative    Lives with Male partner. 2 cats and 2 dogs. Has 2 adult children and one adopted and  grand children.  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    Nehemiah Akers MD  3/26/2019    Score cannot be calculated as LDL is very high  The 10-y ear ASCVD risk score ([place]o METZ Jr., et al., 2013) is: 6%  LDL> 200    Values used to calculate the score:      Age: 68 years      Sex: Female      Is Non- : No      Diabetic: No      Tobacco smoker: No      Systolic Blood Pressure:  120 mmHg      Is BP treated: No      HDL Cholesterol: 72 mg/dL LDL> 200      Total Cholesterol: 294 mg/dL       Social Drivers of Health     Financial Resource Strain: Low Risk  (3/21/2025)    Financial Resource Strain      Within the past 12 months, have you or your family members you live with been unable to get utilities (heat, electricity) when it  was really needed?: No   Food Insecurity: High Risk (3/21/2025)    Food Insecurity      Within the past 12 months, did you worry that your food would run out before you got money to buy more?: Yes      Within the past 12 months, did the food you bought just not last and you didn t have money to get more?: Yes   Transportation Needs: Low Risk  (3/21/2025)    Transportation Needs      Within the past 12 months, has lack of transportation kept you from medical appointments, getting your medicines, non-medical meetings or appointments, work, or from getting things that you need?: No   Physical Activity: Unknown (3/21/2025)    Exercise Vital Sign      Days of Exercise per Week: 7 days      Minutes of Exercise per Session: Not on file   Stress: Stress Concern Present (3/21/2025)    [employer] [place] [employer] - Occupational Stress Questionnaire      Feeling of Stress : To some extent   Social Connections: Unknown (3/21/2025)    Social Connection and Isolation Panel [NHANES]      Frequency of Communication with Friends and Family: Not on file      Frequency of Social Gatherings with Friends and Family: More than three times a week      Attends Restorationist Services: Not on file      Active Member of Clubs or Organizations: Not on file      Attends Club or Organization Meetings: Not on file      Marital Status: Not on file   Interpersonal Safety: Not on file   Housing Stability: Low Risk  (3/21/2025)    Housing Stability      Do you have housing? : Yes      Are you worried about losing your housing?: No       Current Outpatient Medications   Medication Sig Dispense Refill     albuterol (PROAIR HFA/PROVENTIL HFA/VENTOLIN HFA) 108 (90 Base) MCG/ACT inhaler Inhale 2 puffs into the lungs every 6 hours as needed for shortness of breath. 18 g 3     atorvastatin (LIPITOR) 40 MG tablet Take 1 tablet (40 mg) by mouth daily. 90 tablet 3     LANsoprazole (PREVACID) 30 MG DR capsule Take 1 capsule (30 mg) by mouth daily. 90  "capsule 3       Allergies   Allergen Reactions     Hydrocodone-Acetaminophen Nausea and Vomiting       REVIEW OF SYSTEMS:  CONSTITUTIONAL:  NEGATIVE for fever, chills, change in weight, not feeling tired  SKIN:  NEGATIVE for worrisome rashes, no skin lumps, no skin ulcers and no non-healing wounds  EYES:  NEGATIVE for vision changes or irritation.  ENT/MOUTH:  NEGATIVE.  No hearing loss, no hoarseness, no difficulty swallowing.  RESP:  NEGATIVE. No cough or shortness of breath.  BREAST:  NEGATIVE for masses, tenderness or discharge  CV:  NEGATIVE for chest pain, palpitations or peripheral edema  GI:  NEGATIVE for nausea, abdominal pain, heartburn, or change in bowel habits  :  Negative. No dysuria, no hematuria  MUSCULOSKELETAL:  See HPI above  NEURO:  NEGATIVE . No headaches, no dizziness,  no numbness  ENDOCRINE:  NEGATIVE for temperature intolerance, skin/hair changes  HEME/ALLERGY/IMMUNE:  NEGATIVE for bleeding problems  PSYCHIATRIC:  NEGATIVE. no anxiety, no depression.          O: She appears well,   Vitals: Resp 20   Ht 1.607 m (5' 3.25\")   Wt 60.3 kg (133 lb)   BMI 23.37 kg/m    BMI= Body mass index is 23.37 kg/m .   Cervical spine has full range of motion without pain.  Full range of motion of shoulder, elbows, wrists and fingers.  Hand exam revealed     Right: reduced sensation along right thumb, + Phalen's maneuver, + Tinel's sign, + thenar atrophy, + weakness of thumb/pinky pincer grasp.  The thenar atrophy makes it look like the normal bone has lumps.  This is what she was seeing.  Left: normal sensation of hand and arm, negative Phalen's maneuver, negative Tinel's sign, no thenar atrophy, no weakness of thumb/pinky pincer grasp   strength: Right decreased, Left normal.    A: Right carpal tunnel syndrome - severe with thenar atrophy and weakness..      P: Explanation of median nerve entrapment is given.  The treatment spectrum is discussed, including conservative treatment with night splint, " NSAID, activity modification, and possible surgical release.  Given the severity of the thenar weakness and wasting, I recommend carpal tunnel release now.  We will want to make sure that thenar branch is free.  Will proceed with right carpal tunnel release with IV regional block.  Risks, benefits, potential complications and alternatives were discussed.    Again, thank you for allowing me to participate in the care of your patient.        Sincerely,        Darin Tejeda MD    Electronically signed

## 2025-04-02 RX ORDER — CEFAZOLIN SODIUM 2 G/50ML
2 SOLUTION INTRAVENOUS
OUTPATIENT
Start: 2025-04-02

## 2025-04-02 RX ORDER — CEFAZOLIN SODIUM 2 G/50ML
2 SOLUTION INTRAVENOUS SEE ADMIN INSTRUCTIONS
OUTPATIENT
Start: 2025-04-02

## 2025-04-03 ENCOUNTER — TELEPHONE (OUTPATIENT)
Dept: ORTHOPEDICS | Facility: CLINIC | Age: 75
End: 2025-04-03
Payer: COMMERCIAL

## 2025-04-03 NOTE — TELEPHONE ENCOUNTER
[facility] Call Center    Phone Message    May a detailed message be left on voicemail: yes     Reason for Call: Patient will be Cancelling her Surgery. Please Call for Detail  DOS 04/18/2025    Action Taken: Message routed to:  Other: [facility]     Travel Screening: Not Applicable     Date of Service: